# Patient Record
Sex: FEMALE | Race: WHITE | NOT HISPANIC OR LATINO | Employment: FULL TIME | URBAN - METROPOLITAN AREA
[De-identification: names, ages, dates, MRNs, and addresses within clinical notes are randomized per-mention and may not be internally consistent; named-entity substitution may affect disease eponyms.]

---

## 2018-04-24 ENCOUNTER — APPOINTMENT (EMERGENCY)
Dept: ULTRASOUND IMAGING | Facility: HOSPITAL | Age: 38
DRG: 669 | End: 2018-04-24

## 2018-04-24 ENCOUNTER — APPOINTMENT (EMERGENCY)
Dept: CT IMAGING | Facility: HOSPITAL | Age: 38
DRG: 669 | End: 2018-04-24

## 2018-04-24 ENCOUNTER — HOSPITAL ENCOUNTER (INPATIENT)
Facility: HOSPITAL | Age: 38
LOS: 1 days | Discharge: HOME | DRG: 669 | End: 2018-04-26
Attending: EMERGENCY MEDICINE | Admitting: INTERNAL MEDICINE

## 2018-04-24 DIAGNOSIS — N20.0 LEFT NEPHROLITHIASIS: Primary | ICD-10-CM

## 2018-04-24 DIAGNOSIS — N20.0 NEPHROLITHIASIS: ICD-10-CM

## 2018-04-24 LAB
ALBUMIN SERPL BCP-MCNC: 3.4 G/DL (ref 3.5–5)
ALP SERPL-CCNC: 106 U/L (ref 46–116)
ALT SERPL W P-5'-P-CCNC: 26 U/L (ref 12–78)
ANION GAP SERPL CALCULATED.3IONS-SCNC: 8 MMOL/L (ref 4–13)
AST SERPL W P-5'-P-CCNC: 19 U/L (ref 5–45)
BACTERIA UR QL AUTO: ABNORMAL /HPF
BASOPHILS # BLD AUTO: 0.06 THOUSANDS/ΜL (ref 0–0.1)
BASOPHILS NFR BLD AUTO: 1 % (ref 0–1)
BILIRUB SERPL-MCNC: 0.2 MG/DL (ref 0.2–1)
BILIRUB UR QL STRIP: NEGATIVE
BUN SERPL-MCNC: 10 MG/DL (ref 5–25)
CALCIUM SERPL-MCNC: 8.6 MG/DL (ref 8.3–10.1)
CHLORIDE SERPL-SCNC: 103 MMOL/L (ref 100–108)
CLARITY UR: CLEAR
CO2 SERPL-SCNC: 27 MMOL/L (ref 21–32)
COLOR UR: YELLOW
CREAT SERPL-MCNC: 0.84 MG/DL (ref 0.6–1.3)
EOSINOPHIL # BLD AUTO: 0.11 THOUSAND/ΜL (ref 0–0.61)
EOSINOPHIL NFR BLD AUTO: 1 % (ref 0–6)
ERYTHROCYTE [DISTWIDTH] IN BLOOD BY AUTOMATED COUNT: 16.9 % (ref 11.6–15.1)
EXT PREG TEST URINE: NEGATIVE
GFR SERPL CREATININE-BSD FRML MDRD: 90 ML/MIN/1.73SQ M
GLUCOSE SERPL-MCNC: 90 MG/DL (ref 65–140)
GLUCOSE UR STRIP-MCNC: NEGATIVE MG/DL
HCT VFR BLD AUTO: 32.8 % (ref 34.8–46.1)
HGB BLD-MCNC: 10 G/DL (ref 11.5–15.4)
HGB UR QL STRIP.AUTO: ABNORMAL
KETONES UR STRIP-MCNC: ABNORMAL MG/DL
LACTATE SERPL-SCNC: 1.3 MMOL/L (ref 0.5–2)
LEUKOCYTE ESTERASE UR QL STRIP: NEGATIVE
LIPASE SERPL-CCNC: 95 U/L (ref 73–393)
LYMPHOCYTES # BLD AUTO: 2.37 THOUSANDS/ΜL (ref 0.6–4.47)
LYMPHOCYTES NFR BLD AUTO: 23 % (ref 14–44)
MCH RBC QN AUTO: 22.9 PG (ref 26.8–34.3)
MCHC RBC AUTO-ENTMCNC: 30.5 G/DL (ref 31.4–37.4)
MCV RBC AUTO: 75 FL (ref 82–98)
MONOCYTES # BLD AUTO: 0.78 THOUSAND/ΜL (ref 0.17–1.22)
MONOCYTES NFR BLD AUTO: 7 % (ref 4–12)
NEUTROPHILS # BLD AUTO: 7.15 THOUSANDS/ΜL (ref 1.85–7.62)
NEUTS SEG NFR BLD AUTO: 68 % (ref 43–75)
NITRITE UR QL STRIP: NEGATIVE
NON-SQ EPI CELLS URNS QL MICRO: ABNORMAL /HPF
NRBC BLD AUTO-RTO: 0 /100 WBCS
PH UR STRIP.AUTO: 7 [PH] (ref 4.5–8)
PLATELET # BLD AUTO: 496 THOUSANDS/UL (ref 149–390)
PMV BLD AUTO: 11.2 FL (ref 8.9–12.7)
POTASSIUM SERPL-SCNC: 3.5 MMOL/L (ref 3.5–5.3)
PROT SERPL-MCNC: 7.9 G/DL (ref 6.4–8.2)
PROT UR STRIP-MCNC: ABNORMAL MG/DL
RBC # BLD AUTO: 4.36 MILLION/UL (ref 3.81–5.12)
RBC #/AREA URNS AUTO: ABNORMAL /HPF
SODIUM SERPL-SCNC: 138 MMOL/L (ref 136–145)
SP GR UR STRIP.AUTO: <=1.005 (ref 1–1.03)
UROBILINOGEN UR QL STRIP.AUTO: 0.2 E.U./DL
WBC # BLD AUTO: 10.51 THOUSAND/UL (ref 4.31–10.16)
WBC #/AREA URNS AUTO: ABNORMAL /HPF

## 2018-04-24 PROCEDURE — 96375 TX/PRO/DX INJ NEW DRUG ADDON: CPT

## 2018-04-24 PROCEDURE — 83690 ASSAY OF LIPASE: CPT | Performed by: EMERGENCY MEDICINE

## 2018-04-24 PROCEDURE — 96361 HYDRATE IV INFUSION ADD-ON: CPT

## 2018-04-24 PROCEDURE — 76856 US EXAM PELVIC COMPLETE: CPT

## 2018-04-24 PROCEDURE — 96374 THER/PROPH/DIAG INJ IV PUSH: CPT

## 2018-04-24 PROCEDURE — 36415 COLL VENOUS BLD VENIPUNCTURE: CPT | Performed by: EMERGENCY MEDICINE

## 2018-04-24 PROCEDURE — 80053 COMPREHEN METABOLIC PANEL: CPT | Performed by: EMERGENCY MEDICINE

## 2018-04-24 PROCEDURE — 81025 URINE PREGNANCY TEST: CPT | Performed by: EMERGENCY MEDICINE

## 2018-04-24 PROCEDURE — 76830 TRANSVAGINAL US NON-OB: CPT

## 2018-04-24 PROCEDURE — 81001 URINALYSIS AUTO W/SCOPE: CPT | Performed by: EMERGENCY MEDICINE

## 2018-04-24 PROCEDURE — 85025 COMPLETE CBC W/AUTO DIFF WBC: CPT | Performed by: EMERGENCY MEDICINE

## 2018-04-24 PROCEDURE — 96376 TX/PRO/DX INJ SAME DRUG ADON: CPT

## 2018-04-24 PROCEDURE — 74177 CT ABD & PELVIS W/CONTRAST: CPT

## 2018-04-24 PROCEDURE — 83605 ASSAY OF LACTIC ACID: CPT | Performed by: EMERGENCY MEDICINE

## 2018-04-24 RX ORDER — KETOROLAC TROMETHAMINE 30 MG/ML
30 INJECTION, SOLUTION INTRAMUSCULAR; INTRAVENOUS ONCE
Status: COMPLETED | OUTPATIENT
Start: 2018-04-24 | End: 2018-04-24

## 2018-04-24 RX ORDER — ONDANSETRON 2 MG/ML
4 INJECTION INTRAMUSCULAR; INTRAVENOUS ONCE
Status: COMPLETED | OUTPATIENT
Start: 2018-04-24 | End: 2018-04-24

## 2018-04-24 RX ORDER — ONDANSETRON 2 MG/ML
4 INJECTION INTRAMUSCULAR; INTRAVENOUS ONCE
Status: DISCONTINUED | OUTPATIENT
Start: 2018-04-24 | End: 2018-04-26 | Stop reason: HOSPADM

## 2018-04-24 RX ORDER — SODIUM CHLORIDE 9 MG/ML
125 INJECTION, SOLUTION INTRAVENOUS CONTINUOUS
Status: DISCONTINUED | OUTPATIENT
Start: 2018-04-24 | End: 2018-04-25 | Stop reason: SDUPTHER

## 2018-04-24 RX ADMIN — ONDANSETRON 4 MG: 2 INJECTION INTRAMUSCULAR; INTRAVENOUS at 20:10

## 2018-04-24 RX ADMIN — ONDANSETRON 4 MG: 2 INJECTION INTRAMUSCULAR; INTRAVENOUS at 23:04

## 2018-04-24 RX ADMIN — SODIUM CHLORIDE 1000 ML: 0.9 INJECTION, SOLUTION INTRAVENOUS at 23:05

## 2018-04-24 RX ADMIN — SODIUM CHLORIDE 125 ML/HR: 0.9 INJECTION, SOLUTION INTRAVENOUS at 21:13

## 2018-04-24 RX ADMIN — KETOROLAC TROMETHAMINE 30 MG: 30 INJECTION, SOLUTION INTRAMUSCULAR at 23:04

## 2018-04-24 RX ADMIN — KETOROLAC TROMETHAMINE 30 MG: 30 INJECTION, SOLUTION INTRAMUSCULAR at 21:15

## 2018-04-24 RX ADMIN — HYDROMORPHONE HYDROCHLORIDE 1 MG: 1 INJECTION, SOLUTION INTRAMUSCULAR; INTRAVENOUS; SUBCUTANEOUS at 21:13

## 2018-04-24 RX ADMIN — IOHEXOL 100 ML: 350 INJECTION, SOLUTION INTRAVENOUS at 22:16

## 2018-04-25 ENCOUNTER — ANESTHESIA EVENT (INPATIENT)
Dept: PERIOP | Facility: HOSPITAL | Age: 38
DRG: 669 | End: 2018-04-25

## 2018-04-25 ENCOUNTER — ANESTHESIA (INPATIENT)
Dept: PERIOP | Facility: HOSPITAL | Age: 38
DRG: 669 | End: 2018-04-25

## 2018-04-25 ENCOUNTER — APPOINTMENT (INPATIENT)
Dept: RADIOLOGY | Facility: HOSPITAL | Age: 38
DRG: 669 | End: 2018-04-25

## 2018-04-25 PROBLEM — N20.0 LEFT NEPHROLITHIASIS: Status: ACTIVE | Noted: 2018-04-25

## 2018-04-25 LAB
ABO GROUP BLD: NORMAL
ANION GAP SERPL CALCULATED.3IONS-SCNC: 6 MMOL/L (ref 4–13)
BLD GP AB SCN SERPL QL: NEGATIVE
BUN SERPL-MCNC: 8 MG/DL (ref 5–25)
CALCIUM SERPL-MCNC: 7.8 MG/DL (ref 8.3–10.1)
CHLORIDE SERPL-SCNC: 108 MMOL/L (ref 100–108)
CO2 SERPL-SCNC: 23 MMOL/L (ref 21–32)
CREAT SERPL-MCNC: 0.64 MG/DL (ref 0.6–1.3)
ERYTHROCYTE [DISTWIDTH] IN BLOOD BY AUTOMATED COUNT: 17 % (ref 11.6–15.1)
GFR SERPL CREATININE-BSD FRML MDRD: 115 ML/MIN/1.73SQ M
GLUCOSE SERPL-MCNC: 111 MG/DL (ref 65–140)
HCT VFR BLD AUTO: 26.3 % (ref 34.8–46.1)
HGB BLD-MCNC: 8.1 G/DL (ref 11.5–15.4)
MCH RBC QN AUTO: 23.1 PG (ref 26.8–34.3)
MCHC RBC AUTO-ENTMCNC: 30.8 G/DL (ref 31.4–37.4)
MCV RBC AUTO: 75 FL (ref 82–98)
PLATELET # BLD AUTO: 381 THOUSANDS/UL (ref 149–390)
PMV BLD AUTO: 10.5 FL (ref 8.9–12.7)
POTASSIUM SERPL-SCNC: 3.9 MMOL/L (ref 3.5–5.3)
RBC # BLD AUTO: 3.51 MILLION/UL (ref 3.81–5.12)
RH BLD: NEGATIVE
SODIUM SERPL-SCNC: 137 MMOL/L (ref 136–145)
SPECIMEN EXPIRATION DATE: NORMAL
WBC # BLD AUTO: 10.1 THOUSAND/UL (ref 4.31–10.16)

## 2018-04-25 PROCEDURE — 86850 RBC ANTIBODY SCREEN: CPT | Performed by: ANESTHESIOLOGY

## 2018-04-25 PROCEDURE — 85027 COMPLETE CBC AUTOMATED: CPT | Performed by: PHYSICIAN ASSISTANT

## 2018-04-25 PROCEDURE — C1769 GUIDE WIRE: HCPCS | Performed by: UROLOGY

## 2018-04-25 PROCEDURE — 74420 UROGRAPHY RTRGR +-KUB: CPT

## 2018-04-25 PROCEDURE — 94760 N-INVAS EAR/PLS OXIMETRY 1: CPT

## 2018-04-25 PROCEDURE — 52356 CYSTO/URETERO W/LITHOTRIPSY: CPT | Performed by: UROLOGY

## 2018-04-25 PROCEDURE — 86900 BLOOD TYPING SEROLOGIC ABO: CPT | Performed by: ANESTHESIOLOGY

## 2018-04-25 PROCEDURE — BT1FYZZ FLUOROSCOPY OF LEFT KIDNEY, URETER AND BLADDER USING OTHER CONTRAST: ICD-10-PCS | Performed by: UROLOGY

## 2018-04-25 PROCEDURE — 99285 EMERGENCY DEPT VISIT HI MDM: CPT

## 2018-04-25 PROCEDURE — C1758 CATHETER, URETERAL: HCPCS | Performed by: UROLOGY

## 2018-04-25 PROCEDURE — C2617 STENT, NON-COR, TEM W/O DEL: HCPCS | Performed by: UROLOGY

## 2018-04-25 PROCEDURE — 99222 1ST HOSP IP/OBS MODERATE 55: CPT | Performed by: PHYSICIAN ASSISTANT

## 2018-04-25 PROCEDURE — 0TC78ZZ EXTIRPATION OF MATTER FROM LEFT URETER, VIA NATURAL OR ARTIFICIAL OPENING ENDOSCOPIC: ICD-10-PCS | Performed by: UROLOGY

## 2018-04-25 PROCEDURE — 86901 BLOOD TYPING SEROLOGIC RH(D): CPT | Performed by: ANESTHESIOLOGY

## 2018-04-25 PROCEDURE — 0T778DZ DILATION OF LEFT URETER WITH INTRALUMINAL DEVICE, VIA NATURAL OR ARTIFICIAL OPENING ENDOSCOPIC: ICD-10-PCS | Performed by: UROLOGY

## 2018-04-25 PROCEDURE — 87086 URINE CULTURE/COLONY COUNT: CPT | Performed by: UROLOGY

## 2018-04-25 PROCEDURE — 80048 BASIC METABOLIC PNL TOTAL CA: CPT | Performed by: PHYSICIAN ASSISTANT

## 2018-04-25 PROCEDURE — 99223 1ST HOSP IP/OBS HIGH 75: CPT | Performed by: INTERNAL MEDICINE

## 2018-04-25 DEVICE — INLAY OPTIMA URETERAL STENT W/O GUIDEWIRE
Type: IMPLANTABLE DEVICE | Status: FUNCTIONAL
Brand: BARD® INLAY OPTIMA® URETERAL STENT

## 2018-04-25 RX ORDER — ONDANSETRON 2 MG/ML
INJECTION INTRAMUSCULAR; INTRAVENOUS AS NEEDED
Status: DISCONTINUED | OUTPATIENT
Start: 2018-04-25 | End: 2018-04-25 | Stop reason: SURG

## 2018-04-25 RX ORDER — ACETAMINOPHEN 325 MG/1
650 TABLET ORAL EVERY 6 HOURS PRN
Status: DISCONTINUED | OUTPATIENT
Start: 2018-04-25 | End: 2018-04-26 | Stop reason: HOSPADM

## 2018-04-25 RX ORDER — ONDANSETRON 2 MG/ML
4 INJECTION INTRAMUSCULAR; INTRAVENOUS ONCE AS NEEDED
Status: DISCONTINUED | OUTPATIENT
Start: 2018-04-25 | End: 2018-04-25 | Stop reason: HOSPADM

## 2018-04-25 RX ORDER — ONDANSETRON 2 MG/ML
4 INJECTION INTRAMUSCULAR; INTRAVENOUS EVERY 6 HOURS PRN
Status: DISCONTINUED | OUTPATIENT
Start: 2018-04-25 | End: 2018-04-26 | Stop reason: HOSPADM

## 2018-04-25 RX ORDER — MORPHINE SULFATE 2 MG/ML
2 INJECTION, SOLUTION INTRAMUSCULAR; INTRAVENOUS ONCE
Status: COMPLETED | OUTPATIENT
Start: 2018-04-25 | End: 2018-04-25

## 2018-04-25 RX ORDER — MAGNESIUM HYDROXIDE 1200 MG/15ML
LIQUID ORAL AS NEEDED
Status: DISCONTINUED | OUTPATIENT
Start: 2018-04-25 | End: 2018-04-25 | Stop reason: HOSPADM

## 2018-04-25 RX ORDER — SODIUM CHLORIDE 9 MG/ML
125 INJECTION, SOLUTION INTRAVENOUS CONTINUOUS
Status: DISCONTINUED | OUTPATIENT
Start: 2018-04-25 | End: 2018-04-26 | Stop reason: HOSPADM

## 2018-04-25 RX ORDER — HYDROCODONE BITARTRATE AND ACETAMINOPHEN 5; 325 MG/1; MG/1
2 TABLET ORAL EVERY 4 HOURS PRN
Status: DISCONTINUED | OUTPATIENT
Start: 2018-04-25 | End: 2018-04-26 | Stop reason: HOSPADM

## 2018-04-25 RX ORDER — MORPHINE SULFATE 2 MG/ML
2 INJECTION, SOLUTION INTRAMUSCULAR; INTRAVENOUS
Status: DISCONTINUED | OUTPATIENT
Start: 2018-04-25 | End: 2018-04-26

## 2018-04-25 RX ORDER — TAMSULOSIN HYDROCHLORIDE 0.4 MG/1
0.4 CAPSULE ORAL
Status: DISCONTINUED | OUTPATIENT
Start: 2018-04-25 | End: 2018-04-26 | Stop reason: HOSPADM

## 2018-04-25 RX ORDER — MIDAZOLAM HYDROCHLORIDE 1 MG/ML
INJECTION INTRAMUSCULAR; INTRAVENOUS AS NEEDED
Status: DISCONTINUED | OUTPATIENT
Start: 2018-04-25 | End: 2018-04-25 | Stop reason: SURG

## 2018-04-25 RX ORDER — KETOROLAC TROMETHAMINE 30 MG/ML
INJECTION, SOLUTION INTRAMUSCULAR; INTRAVENOUS AS NEEDED
Status: DISCONTINUED | OUTPATIENT
Start: 2018-04-25 | End: 2018-04-25 | Stop reason: SURG

## 2018-04-25 RX ORDER — DIPHENHYDRAMINE HYDROCHLORIDE 50 MG/ML
12.5 INJECTION INTRAMUSCULAR; INTRAVENOUS ONCE AS NEEDED
Status: DISCONTINUED | OUTPATIENT
Start: 2018-04-25 | End: 2018-04-25 | Stop reason: HOSPADM

## 2018-04-25 RX ORDER — PROPOFOL 10 MG/ML
INJECTION, EMULSION INTRAVENOUS AS NEEDED
Status: DISCONTINUED | OUTPATIENT
Start: 2018-04-25 | End: 2018-04-25 | Stop reason: SURG

## 2018-04-25 RX ORDER — FENTANYL CITRATE 50 UG/ML
INJECTION, SOLUTION INTRAMUSCULAR; INTRAVENOUS AS NEEDED
Status: DISCONTINUED | OUTPATIENT
Start: 2018-04-25 | End: 2018-04-25 | Stop reason: SURG

## 2018-04-25 RX ORDER — IBUPROFEN 400 MG/1
400 TABLET ORAL ONCE
COMMUNITY
End: 2018-04-26 | Stop reason: HOSPADM

## 2018-04-25 RX ADMIN — FENTANYL CITRATE 25 MCG: 50 INJECTION, SOLUTION INTRAMUSCULAR; INTRAVENOUS at 19:09

## 2018-04-25 RX ADMIN — KETOROLAC TROMETHAMINE 30 MG: 30 INJECTION, SOLUTION INTRAMUSCULAR at 18:43

## 2018-04-25 RX ADMIN — TAMSULOSIN HYDROCHLORIDE 0.4 MG: 0.4 CAPSULE ORAL at 22:11

## 2018-04-25 RX ADMIN — ONDANSETRON 4 MG: 2 INJECTION INTRAMUSCULAR; INTRAVENOUS at 18:24

## 2018-04-25 RX ADMIN — PROPOFOL 200 MG: 10 INJECTION, EMULSION INTRAVENOUS at 18:24

## 2018-04-25 RX ADMIN — MORPHINE SULFATE 2 MG: 2 INJECTION, SOLUTION INTRAMUSCULAR; INTRAVENOUS at 11:09

## 2018-04-25 RX ADMIN — HYDROCODONE BITARTRATE AND ACETAMINOPHEN 2 TABLET: 5; 325 TABLET ORAL at 21:46

## 2018-04-25 RX ADMIN — MORPHINE SULFATE 2 MG: 2 INJECTION, SOLUTION INTRAMUSCULAR; INTRAVENOUS at 17:47

## 2018-04-25 RX ADMIN — MORPHINE SULFATE 2 MG: 2 INJECTION, SOLUTION INTRAMUSCULAR; INTRAVENOUS at 15:36

## 2018-04-25 RX ADMIN — FENTANYL CITRATE 25 MCG: 50 INJECTION, SOLUTION INTRAMUSCULAR; INTRAVENOUS at 18:30

## 2018-04-25 RX ADMIN — FENTANYL CITRATE 25 MCG: 50 INJECTION, SOLUTION INTRAMUSCULAR; INTRAVENOUS at 18:40

## 2018-04-25 RX ADMIN — MORPHINE SULFATE 2 MG: 2 INJECTION, SOLUTION INTRAMUSCULAR; INTRAVENOUS at 07:25

## 2018-04-25 RX ADMIN — SODIUM CHLORIDE 125 ML/HR: 0.9 INJECTION, SOLUTION INTRAVENOUS at 02:09

## 2018-04-25 RX ADMIN — FENTANYL CITRATE 25 MCG: 50 INJECTION, SOLUTION INTRAMUSCULAR; INTRAVENOUS at 18:43

## 2018-04-25 RX ADMIN — SODIUM CHLORIDE 125 ML/HR: 0.9 INJECTION, SOLUTION INTRAVENOUS at 10:32

## 2018-04-25 RX ADMIN — TAMSULOSIN HYDROCHLORIDE 0.4 MG: 0.4 CAPSULE ORAL at 02:18

## 2018-04-25 RX ADMIN — DEXAMETHASONE SODIUM PHOSPHATE 4 MG: 10 INJECTION INTRAMUSCULAR; INTRAVENOUS at 18:24

## 2018-04-25 RX ADMIN — CEFAZOLIN SODIUM 2000 MG: 2 SOLUTION INTRAVENOUS at 18:24

## 2018-04-25 RX ADMIN — SODIUM CHLORIDE: 0.9 INJECTION, SOLUTION INTRAVENOUS at 18:20

## 2018-04-25 RX ADMIN — LIDOCAINE HYDROCHLORIDE 100 MG: 20 INJECTION, SOLUTION INTRAVENOUS at 18:24

## 2018-04-25 RX ADMIN — MIDAZOLAM HYDROCHLORIDE 2 MG: 1 INJECTION, SOLUTION INTRAMUSCULAR; INTRAVENOUS at 18:20

## 2018-04-25 RX ADMIN — SODIUM CHLORIDE 125 ML/HR: 0.9 INJECTION, SOLUTION INTRAVENOUS at 21:37

## 2018-04-25 NOTE — CASE MANAGEMENT
Initial Clinical Review    Admission: Date/Time/Statement: 4/25/18 @ 0001     Orders Placed This Encounter   Procedures    Inpatient Admission     Standing Status:   Standing     Number of Occurrences:   1     Order Specific Question:   Admitting Physician     Answer:   Urszula Miranda [[id number]]     Order Specific Question:   Level of Care     Answer:   Med Surg [[id number]]     Order Specific Question:   Estimated length of stay     Answer:   More than 2 Midnights     Order Specific Question:   Certification     Answer:   I certify that inpatient services are medically necessary for this patient for a duration of greater than two midnights  See H&P and MD Progress Notes for additional information about the patient's course of treatment  ED: Date/Time/Mode of Arrival:   ED Arrival Information     Expected Arrival Acuity Means of Arrival Escorted By Service Admission Type    - 4/24/2018 19:24 Urgent Walk-In Family Member General Medicine Urgent    Arrival Complaint    abdominal pain          Chief Complaint:   Chief Complaint   Patient presents with    Abdominal Pain     Patient c/o left lower abdominal pain that started for approximately 1 month  History of Illness: Natty Hutchison is a 40 y o  female who presents with complaints of left-sided abdominal pain that radiates to her back  Patient states that for last 3 weeks she has been having some subtle pain but that around 4:00 p m  today she did sudden increase intensity of this pain  Patient states she feels nauseated with vomiting  Patient had a fever, chills, chest pain, shortness of breath  Patient states she has had kidney stones in the past none of which required any type of procedure  Patient states her last 1 was approximately 4 years ago       ED Vital Signs:   ED Triage Vitals   Temperature Pulse Respirations Blood Pressure SpO2   04/24/18 1946 04/24/18 1945 04/24/18 1945 04/24/18 1946 04/24/18 1945   98 1 °F (36 7 °C) 85 18 161/90 100 % Temp Source Heart Rate Source Patient Position - Orthostatic VS BP Location FiO2 (%)   04/24/18 1945 04/24/18 1945 04/24/18 1945 04/24/18 1945 --   Oral Monitor Sitting Right arm       Pain Score       04/24/18 2113       Worst Possible Pain        Wt Readings from Last 1 Encounters:   04/25/18 90 2 kg (198 lb 13 7 oz)       Vital Signs (abnormal):   04/24/18 2245 -- 89 20 138/66 94 % -- DB   04/24/18 2118 -- 69 16  176/91 100 % -- DB   04/24/18 1946 98 1 °F (36 7 °C) -- -- 161/90        Abnormal Labs/Diagnostic Test Results: alb   3 4, wbc 10 51, H&H   10 0 32 8, plt  496  US pelvis- wnl   CT abd   CT abd -    1   Proximal left ureter 5 mm calculus resulting in mild to moderate left hydroureteronephrosis and delayed left nephrogram   Additional nonobstructive bilateral nephrolithiasis as above  2   Enlarged fatty liver          ED Treatment:   Medication Administration from 04/24/2018 1924 to 04/25/2018 0125       Date/Time Order Dose Route Action Action by Comments     04/24/2018 2010 ondansetron (ZOFRAN) injection 4 mg 4 mg Intravenous Given Godwin Zuluaga RN      04/24/2018 2115 ketorolac (TORADOL) injection 30 mg 30 mg Intravenous Given Godwin Zuluaga RN      04/24/2018 2113 HYDROmorphone (DILAUDID) injection 1 mg 1 mg Intravenous Given Godwin Zuluaga RN      04/24/2018 2304 sodium chloride 0 9 % infusion 0 mL/hr Intravenous Stopped Godwin Zuluaga RN      04/24/2018 2113 sodium chloride 0 9 % infusion 125 mL/hr Intravenous Batsheva 37 Godwin Zuluaga [facility]      04/24/2018 2216 iohexol (OMNIPAQUE) 350 MG/ML injection (MULTI-DOSE) 100 mL 100 mL Intravenous Given [facility]      04/24/2018 2301 HYDROmorphone (DILAUDID) injection 1 mg 1 mg Intravenous Not Given Godwin Zuluaga RN Patient feeling dizzy and nauseated  Notified physician       04/24/2018 2301 ondansetron (ZOFRAN) injection 4 mg 4 mg Intravenous Not Given Godwin Zuluaga RN      04/24/2018 9572 ondansetron (ZOFRAN) injection 4 mg 4 mg Intravenous Given Jan Martínez RN      04/24/2018 2304 ketorolac (TORADOL) injection 30 mg 30 mg Intravenous Given Jan Martínez RN      04/25/2018 0059 sodium chloride 0 9 % bolus 1,000 mL 0 mL Intravenous Stopped Jan Martínez RN      04/24/2018 2305 sodium chloride 0 9 % bolus 1,000 mL 1,000 mL Intravenous New Bag Jan Martínez RN           Past Medical/Surgical History: Active Ambulatory Problems     Diagnosis Date Noted    No Active Ambulatory Problems     Resolved Ambulatory Problems     Diagnosis Date Noted    No Resolved Ambulatory Problems     Past Medical History:   Diagnosis Date    Renal disorder        Admitting Diagnosis: Abdominal pain [R10 9]  Left nephrolithiasis [N20 0]    Age/Sex: 40 y o  female    Assessment/Plan:        * Left nephrolithiasis   Assessment & Plan     Admit  Pain control  IV fluids  Urology consult pending             VTE Prophylaxis: Pharmacologic VTE Prophylaxis contraindicated due to Low risk  / reason for no mechanical VTE prophylaxis Low risk   Code Status:  Full  POLST: There is no POLST form on file for this patient (pre-hospital)  Discussion with family:  Family is present for the entire evaluation    Anticipated Length of Stay:  Patient will be admitted on an Inpatient basis with an anticipated length of stay of  more than 2 midnights     Justification for Hospital Stay:  Pain control, Urology evaluation    Admission Orders:  Scheduled Meds:   Current Facility-Administered Medications:  acetaminophen 650 mg Oral Q6H PRN Khris Desouza PA-C    HYDROmorphone 1 mg Intravenous Once Cheral Lefort, MD    morphine injection 2 mg Intravenous Q3H PRN Khris Desouza PA-C    ondansetron 4 mg Intravenous Once Cheral Lefort, MD    ondansetron 4 mg Intravenous Q6H PRN Khris Desouza PA-C    sodium chloride 125 mL/hr Intravenous Continuous Khris Desouza PA-C Last Rate: 125 mL/hr (04/25/18 1032)   tamsulosin 0 4 mg Oral Daily With [facility] Karlene Piña PA-C      Continuous Infusions:   sodium chloride 125 mL/hr Last Rate: 125 mL/hr (04/25/18 1032)     PRN Meds:   acetaminophen    morphine injection  x2    Ondansetron    4/25  cbc  ,bmp   Up w/ assist   4/25 cystoscopy   Urology consult     Urology note 4/25  Proximal left ureter 5 mm calculus resulting in mild to moderate left   PLAN:    - reviewed spontaneous passage versus surgical intervention in the form of ureteroscopy  We discussed the risks and benefits of each of these options  - plan will be continue IVF, tamsulosin, and straining fluids  Continue pain control  - If patient's pain does not improve throughout this morning/afternoon, she will proceed to the OR for cystoscopy, left ureteroscopy, holmium laser, basket extraction, retrograde pyelogram, and left ureteral stent insertion  - remain NPO

## 2018-04-25 NOTE — OP NOTE
OPERATIVE REPORT  PATIENT NAME: Marycruz Calvo    :  1980  MRN: [id number]  Pt Location: [facility] OR ROOM 04    SURGERY DATE: 2018    Surgeon(s) and Role:     * Merissa Mina MD - Primary    Preop Diagnosis:  Left nephrolithiasis [N20 0]    Post-Op Diagnosis Codes:     * Left nephrolithiasis [N20 0]    Procedure(s) (LRB):  CYSTOSCOPY URETEROSCOPY WITH LITHOTRIPSY HOLMIUM LASER, RETROGRADE PYELOGRAM AND INSERTION STENT URETERAL (Left)    Specimen(s):  ID Type Source Tests Collected by Time Destination   A : urine culture Urine Urine, Cystoscopic URINE CULTURE Merissa Mina MD 2018 1854        Estimated Blood Loss:   Minimal    Drains:  Left 22-6 Qatari double-J ureteral stent, string left in place       Anesthesia Type:   General    Operative Indications:  Left nephrolithiasis [N20 0]      Operative Findings:  5 mm left proximal ureteral calculus decimated with holmium laser lithotripsy after the stone was pushed back into the kidney  Complications:   None      Procedure Description: Marycruz Calvo is a 40y o -year-old female  with a history of acute onset left-sided flank pain  A CT scan revealed a 5 mm left proximal ureteral calculus  The patient required admission secondary to pain control  There were no signs of fever, leukocytosis, or infection  Risk and benefits of ureteroscopy were discussed and reviewed  Informed consent was obtained  The patient was brought to the operating room on 2018  After the smooth induction of general LMA anesthesia, the patient was placed in the dorsal lithotomy position  Her genitalia was prepped and draped in a sterile fashion  Intravenous antibiotics were administered  A timeout was performed with all members of the operative team confirmed the patient's identity, procedure to be performed, and laterality of the case  A 22 Qatari rigid cystoscope with 30° lens was inserted  The bladder was thoroughly inspected   It was no evidence of mucosal abnormalities or lesions  There were no calculi identified  Attention was focused on the left ureteral orifice  A 5 [place] open-ended catheter was inserted and a left retrograde pyelogram was performed  A filling defect in the left proximal ureter was identified consistent with the stone  A wire was passed proximal to the stone and secured as a safety  A 12 [place] Ruiz catheter was inserted for continuous bladder drainage  A long semirigid ureteroscope was then inserted adjacent to the wire  The stone was identified but could not be engaged with the laser secondary to the torque on the scope  I therefore placed a 2nd wire followed by ureteral access sheath followed by an Olympus 5 3 [place] flexible ureteral scope  The stone was identified at the junction of the kidney and ureter and was pushed back into the kidney with the flexible ureteral scope  The stone was then decimated with holmium laser lithotripsy until the fragments were small enough to be passable  The flexible ureteral scope and sheath were then removed together and the ureter was inspected  There were no residual stones within the ureter     The wire was backloaded through the cystoscope  The cystoscope was repassed into the bladder  A 22-6 [place] left double-J ureteral stent was then placed without difficulty  The proximal coil was appreciated in the left renal pelvis and the distal coil was visualized within the bladder  The bladder was emptied and the cystoscope was removed  A string was left in place and secured to the inner thigh with Tegaderm  Overall the patient tolerated the procedure well and were no complications  The patient was extubated in the operating room and transferred to the PACU in stable condition at the conclusion of the case        Patient Disposition:  PACU stable and extubated    SIGNATURE: Kayla Henley MD  DATE: April 25, 2018  TIME: 7:14 PM

## 2018-04-25 NOTE — ED PROVIDER NOTES
History  Chief Complaint   Patient presents with    Abdominal Pain     Patient c/o left lower abdominal pain that started for approximately 1 month  Patient is a 44-year-old female  She has had left lower quadrant abdominal pain for about 3 weeks  She has had C-sections  She has had a tubal ligation  She has had a cholecystectomy  She has had kidney stones  Yesterday patient had intercourse  Afterwards the pain became worse  She became nauseated when the pain became worse  She has not vomited  No hematemesis  It does not radiate to the back like prior kidney stones  No diarrhea or constipation  No melena or hematochezia  No vaginal discharge or bleeding  No dysuria, hematuria or frequency  No other urinary complaints  Pain is currently severe  No relieving factors  None       Past Medical History:   Diagnosis Date    Renal disorder        Past Surgical History:   Procedure Laterality Date     SECTION      CHOLECYSTECTOMY      TUBAL LIGATION         History reviewed  No pertinent family history  I have reviewed and agree with the history as documented  Social History   Substance Use Topics    Smoking status: Never Smoker    Smokeless tobacco: Never Used    Alcohol use No        Review of Systems   Constitutional: Negative for chills and fever  HENT: Negative for rhinorrhea and sore throat  Eyes: Negative for pain, redness and visual disturbance  Respiratory: Negative for cough and shortness of breath  Cardiovascular: Negative for chest pain and leg swelling  Gastrointestinal: Positive for abdominal pain and nausea  Negative for diarrhea and vomiting  Endocrine: Negative for polydipsia and polyuria  Genitourinary: Negative for dysuria, frequency, hematuria, vaginal bleeding and vaginal discharge  Musculoskeletal: Negative for back pain and neck pain  Skin: Negative for rash and wound  Allergic/Immunologic: Negative for immunocompromised state  Neurological: Negative for weakness, numbness and headaches  Hematological: Does not bruise/bleed easily  Psychiatric/Behavioral: Negative for hallucinations and suicidal ideas  All other systems reviewed and are negative  Physical Exam  ED Triage Vitals   Temperature Pulse Respirations Blood Pressure SpO2   04/24/18 1946 04/24/18 1945 04/24/18 1945 04/24/18 1946 04/24/18 1945   98 1 °F (36 7 °C) 85 18 161/90 100 %      Temp Source Heart Rate Source Patient Position - Orthostatic VS BP Location FiO2 (%)   04/24/18 1945 04/24/18 1945 04/24/18 1945 04/24/18 1945 --   Oral Monitor Sitting Right arm       Pain Score       --                  Orthostatic Vital Signs  Vitals:    04/24/18 1945 04/24/18 1946   BP:  161/90   Pulse: 85    Patient Position - Orthostatic VS: Sitting        Physical Exam   Constitutional: She is oriented to person, place, and time  She appears well-developed and well-nourished  She appears distressed  HENT:   Head: Normocephalic and atraumatic  Mouth/Throat: Oropharynx is clear and moist    Eyes: Conjunctivae are normal  Right eye exhibits no discharge  Left eye exhibits no discharge  No scleral icterus  Neck: Normal range of motion  Neck supple  Cardiovascular: Normal rate, regular rhythm, normal heart sounds and intact distal pulses  Exam reveals no gallop and no friction rub  No murmur heard  Pulmonary/Chest: Effort normal and breath sounds normal  No stridor  No respiratory distress  She has no wheezes  She has no rales  Abdominal: Soft  Bowel sounds are normal  She exhibits no distension  There is tenderness  There is no rebound and no guarding  There is severe tenderness to the left lower quadrant  Musculoskeletal: Normal range of motion  She exhibits no edema, tenderness or deformity  No CVA tenderness  No calf tenderness  Neurological: She is alert and oriented to person, place, and time  She has normal strength  No sensory deficit   GCS eye subscore is 4  GCS verbal subscore is 5  GCS motor subscore is 6  Skin: Skin is warm and dry  No rash noted  She is not diaphoretic  Psychiatric: She has a normal mood and affect  Her behavior is normal    Vitals reviewed  ED Medications  Medications   ketorolac (TORADOL) injection 30 mg (not administered)   HYDROmorphone (DILAUDID) injection 1 mg (not administered)   sodium chloride 0 9 % infusion (not administered)   ondansetron (ZOFRAN) injection 4 mg (4 mg Intravenous Given 4/24/18 2010)       Diagnostic Studies  Results Reviewed     Procedure Component Value Units Date/Time    CBC and differential [[id number]]     Lab Status:  No result Specimen:  Blood     UA w Reflex to Microscopic [[id number]]     Lab Status:  No result Specimen:  Urine     POCT pregnancy, urine [[id number]]     Lab Status:  No result     Comprehensive metabolic panel [[id number]]     Lab Status:  No result Specimen:  Blood     Lipase [[id number]]     Lab Status:  No result Specimen:  Blood     Lactic acid, plasma [[id number]]     Lab Status:  No result Specimen:  Blood                  US pelvis complete    (Results Pending)   CT abdomen pelvis with contrast    (Results Pending)              Procedures  Procedures       Phone Contacts  ED Phone Contact    ED Course  ED Course                                [facility]  CritCare Time    Disposition  Final diagnoses:   None     ED Disposition     None      Follow-up Information    None       Patient's Medications    No medications on file     No discharge procedures on file      ED Provider  Electronically Signed by           Dorian Runner, MD  04/26/18 1580

## 2018-04-25 NOTE — H&P
H&P- Jimi العلي 1980, 40 y o  female MRN: [id number]    Unit/Bed#: ED 23 Encounter: [id number]    Primary Care Provider: No primary care provider on file  Date and time admitted to hospital: 4/24/2018  7:39 PM        * Left nephrolithiasis   Assessment & Plan    Admit  Pain control  IV fluids  Urology consult pending                VTE Prophylaxis: Pharmacologic VTE Prophylaxis contraindicated due to Low risk  / reason for no mechanical VTE prophylaxis Low risk   Code Status:  Full  POLST: There is no POLST form on file for this patient (pre-hospital)  Discussion with family:  Family is present for the entire evaluation  Anticipated Length of Stay:  Patient will be admitted on an Inpatient basis with an anticipated length of stay of  more than 2 midnights  Justification for Hospital Stay:  Pain control, Urology evaluation    Total Time for Visit, including Counseling / Coordination of Care: 30 minutes  Greater than 50% of this total time spent on direct patient counseling and coordination of care  Chief Complaint:   Abdominal pain    History of Present Illness:    Jimi العلي is a 40 y o  female who presents with complaints of left-sided abdominal pain that radiates to her back  Patient states that for last 3 weeks she has been having some subtle pain but that around 4:00 p m  today she did sudden increase intensity of this pain  Patient states she feels nauseated with vomiting  Patient had a fever, chills, chest pain, shortness of breath  Patient states she has had kidney stones in the past none of which required any type of procedure  Patient states her last 1 was approximately 4 years ago       Review of Systems:    Review of Systems   Gastrointestinal: Positive for abdominal pain  All other systems reviewed and are negative        Past Medical and Surgical History:     Past Medical History:   Diagnosis Date    Renal disorder        Past Surgical History:   Procedure Laterality Date     SECTION      CHOLECYSTECTOMY      TUBAL LIGATION         Meds/Allergies:    Prior to Admission medications    Not on File     I have reviewed home medications with patient personally  Allergies: No Known Allergies    Social History:     Marital Status: /Civil Union   Occupation:    Patient Pre-hospital Living Situation:  Lives at home  Patient Pre-hospital Level of Mobility:  Ambulatory  Patient Pre-hospital Diet Restrictions:  None  Substance Use History:   History   Alcohol Use No     History   Smoking Status    Never Smoker   Smokeless Tobacco    Never Used     History   Drug Use No       Family History:    History reviewed  No pertinent family history  Physical Exam:     Vitals:   Blood Pressure: 116/73 (18)  Pulse: 75 (18)  Temperature: 98 1 °F (36 7 °C) (18)  Temp Source: Oral (18)  Respirations: 20 (18)  Height: 5' 4" (162 6 cm) (18)  Weight - Scale: 92 5 kg (204 lb) (18)  SpO2: 96 % (18)    Physical Exam   Constitutional: She is oriented to person, place, and time  She appears well-developed and well-nourished  HENT:   Head: Normocephalic and atraumatic  Right Ear: External ear normal    Left Ear: External ear normal    Nose: Nose normal    Mouth/Throat: Oropharynx is clear and moist    Eyes: Conjunctivae and EOM are normal  Pupils are equal, round, and reactive to light  Neck: Normal range of motion  Cardiovascular: Normal rate, regular rhythm and normal heart sounds  Pulmonary/Chest: Effort normal and breath sounds normal    Abdominal: Soft  There is tenderness in the left upper quadrant and left lower quadrant  Musculoskeletal: Normal range of motion  Neurological: She is alert and oriented to person, place, and time  Skin: Skin is warm and dry  Psychiatric: She has a normal mood and affect   Her behavior is normal  Judgment and thought content normal    Vitals reviewed  Additional Data:     Lab Results: I have personally reviewed pertinent reports  Results from last 7 days  Lab Units 04/24/18  2115   WBC Thousand/uL 10 51*   HEMOGLOBIN g/dL 10 0*   HEMATOCRIT % 32 8*   PLATELETS Thousands/uL 496*   NEUTROS PCT % 68   LYMPHS PCT % 23   MONOS PCT % 7   EOS PCT % 1       Results from last 7 days  Lab Units 04/24/18  2115   SODIUM mmol/L 138   POTASSIUM mmol/L 3 5   CHLORIDE mmol/L 103   CO2 mmol/L 27   BUN mg/dL 10   CREATININE mg/dL 0 84   CALCIUM mg/dL 8 6   TOTAL PROTEIN g/dL 7 9   BILIRUBIN TOTAL mg/dL 0 20   ALK PHOS U/L 106   ALT U/L 26   AST U/L 19   GLUCOSE RANDOM mg/dL 90           Imaging: I have personally reviewed pertinent reports  US pelvis complete w transvaginal   Final Result by Madyson Milton DO (04/24 2233)       Limited study as described above  No acute findings  Workstation performed: [id number]         CT abdomen pelvis with contrast   Final Result by Zoë Astorga MD (04/24 2230)      1  Proximal left ureter 5 mm calculus resulting in mild to moderate left hydroureteronephrosis and delayed left nephrogram   Additional nonobstructive bilateral nephrolithiasis as above  2   Enlarged fatty liver  Workstation performed: [id number]             EKG, Pathology, and Other Studies Reviewed on Admission:   · EKG:     Allscripts / Epic Records Reviewed: Yes     ** Please Note: This note has been constructed using a voice recognition system   **

## 2018-04-25 NOTE — PLAN OF CARE
Problem: PAIN - ADULT  Goal: Verbalizes/displays adequate comfort level or baseline comfort level  Interventions:  - Encourage patient to monitor pain and request assistance  - Assess pain using appropriate pain scale  - Administer analgesics based on type and severity of pain and evaluate response  - Implement non-pharmacological measures as appropriate and evaluate response  - Consider cultural and social influences on pain and pain management  - Notify physician/advanced practitioner if interventions unsuccessful or patient reports new pain   Outcome: Progressing      Problem: INFECTION - ADULT  Goal: Absence or prevention of progression during hospitalization  INTERVENTIONS:  - Assess and monitor for signs and symptoms of infection  - Monitor lab/diagnostic results  - Monitor all insertion sites, i e  indwelling lines, tubes, and drains  - Monitor endotracheal (as able) and nasal secretions for changes in amount and color  - [place] appropriate cooling/warming therapies per order  - Administer medications as ordered  - Instruct and encourage patient and family to use good hand hygiene technique  - Identify and instruct in appropriate isolation precautions for identified infection/condition   Outcome: Progressing      Problem: SAFETY ADULT  Goal: Patient will remain free of falls  INTERVENTIONS:  - Assess patient frequently for physical needs  -  Identify cognitive and physical deficits and behaviors that affect risk of falls    -  [place] fall precautions as indicated by assessment   - Educate patient/family on patient safety including physical limitations  - Instruct patient to call for assistance with activity based on assessment  - Modify environment to reduce risk of injury  - Consider OT/PT consult to assist with strengthening/mobility    Outcome: Progressing    Goal: Maintain or return to baseline ADL function  INTERVENTIONS:  -  Assess patient's ability to carry out ADLs; assess patient's baseline for ADL function and identify physical deficits which impact ability to perform ADLs (bathing, care of mouth/teeth, toileting, grooming, dressing, etc )  - Assess/evaluate cause of self-care deficits   - Assess range of motion  - Assess patient's mobility; develop plan if impaired  - Assess patient's need for assistive devices and provide as appropriate  - Encourage maximum independence but intervene and supervise when necessary  ¯ Involve family in performance of ADLs  ¯ Assess for home care needs following discharge   ¯ Request OT consult to assist with ADL evaluation and planning for discharge  ¯ Provide patient education as appropriate   Outcome: Progressing    Goal: Maintain or return mobility status to optimal level  INTERVENTIONS:  - Assess patient's baseline mobility status (ambulation, transfers, stairs, etc )    - Identify cognitive and physical deficits and behaviors that affect mobility  - Identify mobility aids required to assist with transfers and/or ambulation (gait belt, sit-to-stand, lift, walker, cane, etc )  - [place] fall precautions as indicated by assessment  - Record patient progress and toleration of activity level on Mobility SBAR; progress patient to next Phase/Stage  - Instruct patient to call for assistance with activity based on assessment  - Request Rehabilitation consult to assist with strengthening/weightbearing, etc    Outcome: Progressing      Problem: DISCHARGE PLANNING  Goal: Discharge to home or other facility with appropriate resources  INTERVENTIONS:  - Identify barriers to discharge w/patient and caregiver  - Arrange for needed discharge resources and transportation as appropriate  - Identify discharge learning needs (meds, wound care, etc )  - Arrange for interpretive services to assist at discharge as needed  - Refer to Case Management Department for coordinating discharge planning if the patient needs post-hospital services based on physician/advanced practitioner order or complex needs related to functional status, cognitive ability, or social support system   Outcome: Progressing      Problem: Knowledge Deficit  Goal: Patient/family/caregiver demonstrates understanding of disease process, treatment plan, medications, and discharge instructions  Complete learning assessment and assess knowledge base  Interventions:  - Provide teaching at level of understanding  - Provide teaching via preferred learning methods   Outcome: Progressing      Problem: GENITOURINARY - ADULT  Goal: Maintains or returns to baseline urinary function  INTERVENTIONS:  - Assess urinary function  - Encourage oral fluids to ensure adequate hydration  - Administer IV fluids as ordered to ensure adequate hydration  - Administer ordered medications as needed  - Offer frequent toileting  - Follow urinary retention protocol if ordered   Outcome: Progressing    Goal: Absence of urinary retention  INTERVENTIONS:  - Assess patients ability to void and empty bladder  - Monitor I/O  - Bladder scan as needed  - Discuss with physician/AP medications to alleviate retention as needed  - Discuss catheterization for long term situations as appropriate   Outcome: Progressing      Problem: Potential for Falls  Goal: Patient will remain free of falls  INTERVENTIONS:  - Assess patient frequently for physical needs  -  Identify cognitive and physical deficits and behaviors that affect risk of falls    -  [place] fall precautions as indicated by assessment   - Educate patient/family on patient safety including physical limitations  - Instruct patient to call for assistance with activity based on assessment  - Modify environment to reduce risk of injury  - Consider OT/PT consult to assist with strengthening/mobility    Outcome: Progressing

## 2018-04-25 NOTE — ANESTHESIA PREPROCEDURE EVALUATION
Review of Systems/Medical History  Patient summary reviewed  Chart reviewed  No history of anesthetic complications     Cardiovascular  Exercise tolerance: good,     Pulmonary  Negative pulmonary ROS        GI/Hepatic  Negative GI/hepatic ROS          Kidney stones,        Endo/Other    Obesity    GYN       Hematology  Anemia iron deficiency anemia,    Comment: Microcytic anemia- most likely iron deficiency recommend Iron Panel  Musculoskeletal  Negative musculoskeletal ROS        Neurology  Negative neurology ROS      Psychology   Negative psychology ROS              Physical Exam    Airway    Mallampati score: I  TM Distance: >3 FB  Neck ROM: full     Dental   Comment: Cracked on top right and bottom left,     Cardiovascular      Pulmonary      Other Findings        Anesthesia Plan  ASA Score- 2 Emergent    Anesthesia Type- general with ASA Monitors  Additional Monitors:   Airway Plan: LMA  Plan Factors-    Induction- intravenous  Postoperative Plan- Plan for postoperative opioid use  Planned trial extubation    Informed Consent- Anesthetic plan and risks discussed with patient  I personally reviewed this patient with the CRNA  Discussed and agreed on the Anesthesia Plan with the CRNA              Lab Results   Component Value Date    WBC 10 10 04/25/2018    HGB 8 1 (L) 04/25/2018    HCT 26 3 (L) 04/25/2018    MCV 75 (L) 04/25/2018     04/25/2018     Lab Results   Component Value Date    GLUCOSE 111 04/25/2018    CALCIUM 7 8 (L) 04/25/2018     04/25/2018    K 3 9 04/25/2018    CO2 23 04/25/2018     04/25/2018    BUN 8 04/25/2018    CREATININE 0 64 04/25/2018

## 2018-04-25 NOTE — CONSULTS
UROLOGY CONSULTATION NOTE     Patient Identifiers: Yaritza Lynne (MRN [id number])  Service Requesting Consultation: [facility]  Service Providing Consultation:  Urology, Estevan Thayer PA-C    Date of Service: 2018    Reason for Consultation: ureteral stone    History of Present Illness:     Yaritza Lynne is a 40 y o  old with a history of nephrolithiasis whom presented to the ED yesterday with left flank pain  Patient states she has a history of nephrolithiasis  Denies ever requiring surgical intervention for stone removal since previous stones were small in nature  Yesterday she experienced left flank pain that radiates to the LLQ  Has associated nausea and vomiting  Denies any dysuria, gross hematuria, fevers, or chills  CT in the ED revealed 5mm left proximal ureteral stone with mild-mod hydronephrosis  UA was leukocyte and nitrite negative  She remains afebrile  Creatinine remains stable  No white count  Patient admits to continued flank pain this morning that is only being moderately controlled       Past Medical, Past Surgical History:     Past Medical History:   Diagnosis Date    Renal disorder    :    Past Surgical History:   Procedure Laterality Date     SECTION      CHOLECYSTECTOMY      TUBAL LIGATION     :    Medications, Allergies:     Current Facility-Administered Medications:     acetaminophen (TYLENOL) tablet 650 mg, 650 mg, Oral, Q6H PRN, Ilan Jalloh PA-C    HYDROmorphone (DILAUDID) injection 1 mg, 1 mg, Intravenous, Once, Mary Swartz MD    morphine injection 2 mg, 2 mg, Intravenous, Q3H PRN, Ilan Jalloh PA-C, 2 mg at 18 0725    ondansetron (ZOFRAN) injection 4 mg, 4 mg, Intravenous, Once, Mary Swartz MD    ondansetron [facility]) injection 4 mg, 4 mg, Intravenous, Q6H PRN, Ilan Jalloh PA-C    sodium chloride 0 9 % infusion, 125 mL/hr, Intravenous, Continuous, Ilan Jalloh PA-C, Last Rate: 125 mL/hr at 18 0209, 125 mL/hr at 04/25/18 0209    tamsulosin (FLOMAX) capsule 0 4 mg, 0 4 mg, Oral, Daily With Newton Skaggs PA-C, 0 4 mg at 04/25/18 0218    Allergies:  No Known Allergies:    Social and Family History:   Social History:   Social History   Substance Use Topics    Smoking status: Never Smoker    Smokeless tobacco: Never Used    Alcohol use No        History   Smoking Status    Never Smoker   Smokeless Tobacco    Never Used       Family History:  History reviewed  No pertinent family history :     Review of Systems:     General: Fever, chills, or night sweats: negative  Cardiac: Negative for chest pain  Pulmonary: Negative for shortness of breath  Gastrointestinal: Abdominal pain positive  Nausea, vomiting, or diarrhea positive,  Genitourinary: See HPI above  Patient does not have hematuria  All other systems queried were negative  Physical Exam:   General: Patient is pleasant and in NAD  Awake and alert  /67 (BP Location: Right arm)   Pulse 67   Temp 98 2 °F (36 8 °C) (Oral)   Resp 17   Ht 5' 4" (1 626 m)   Wt 90 2 kg (198 lb 13 7 oz)   LMP 04/15/2018   SpO2 99%   BMI 34 13 kg/m²   Cardiac: Peripheral edema: negative  Pulmonary: Non-labored breathing  Abdomen: Soft, non-tender, non-distended  Left CVA tenderness present  No suprapubic tenderness  Surgical scars from 3 c-sections and cholecystectomy  NELSON: None    Labs:     Lab Results   Component Value Date    HGB 8 1 (L) 04/25/2018    HCT 26 3 (L) 04/25/2018    WBC 10 10 04/25/2018     04/25/2018   ]    Lab Results   Component Value Date     04/25/2018    K 3 9 04/25/2018     04/25/2018    CO2 23 04/25/2018    BUN 8 04/25/2018    CREATININE 0 64 04/25/2018    CALCIUM 7 8 (L) 04/25/2018    GLUCOSE 111 04/25/2018   ]    Imaging:   CT ABDOMEN AND PELVIS WITH IV CONTRAST     INDICATION:   llq abd pain      COMPARISON: None      TECHNIQUE:  CT examination of the abdomen and pelvis was performed   Axial, sagittal, and coronal 2D reformatted images were created from the source data and submitted for interpretation      Radiation dose length product (DLP) for this visit:  658 mGy-cm   This examination, like all CT scans performed in the [facility], was performed utilizing techniques to minimize radiation dose exposure, including the use of iterative   reconstruction and automated exposure control      IV Contrast:  100 mL of iohexol (OMNIPAQUE)  Enteric Contrast:  Enteric contrast was not administered      FINDINGS:     ABDOMEN     LOWER CHEST:  No clinically significant abnormality identified in the visualized lower chest      LIVER/BILIARY TREE:  Enlarged fatty liver      GALLBLADDER:  Gallbladder is surgically absent      SPLEEN:  Unremarkable      PANCREAS:  Unremarkable      ADRENAL GLANDS:  Unremarkable      KIDNEYS/URETERS:  5 mm proximal left ureteral calculus best seen on 601/77 at the L3 level and resulting in mild to moderate left hydroureteronephrosis  Delayed left renal nephrogram   Additional small nonobstructive left and right renal calculi      Probable cysts also noted      STOMACH AND BOWEL:  Unremarkable      APPENDIX:  A normal appendix was visualized      ABDOMINOPELVIC CAVITY:  Minimal pelvic free fluid noted      VESSELS:  Unremarkable for patient's age      PELVIS     REPRODUCTIVE ORGANS:  Unremarkable for patient's age      URINARY BLADDER:  Unremarkable      ABDOMINAL WALL/INGUINAL REGIONS:  Unremarkable      OSSEOUS STRUCTURES:  No acute fracture or destructive osseous lesion      IMPRESSION:     1  Proximal left ureter 5 mm calculus resulting in mild to moderate left hydroureteronephrosis and delayed left nephrogram   Additional nonobstructive bilateral nephrolithiasis as above  2   Enlarged fatty liver        ASSESSMENT:     Proximal left ureter 5 mm calculus resulting in mild to moderate left    PLAN:     - reviewed spontaneous passage versus surgical intervention in the form of ureteroscopy  We discussed the risks and benefits of each of these options  - plan will be continue IVF, tamsulosin, and straining fluids  Continue pain control  - If patient's pain does not improve throughout this morning/afternoon, she will proceed to the OR for cystoscopy, left ureteroscopy, holmium laser, basket extraction, retrograde pyelogram, and left ureteral stent insertion  - remain NPO  Thank you for allowing me to participate in this patients care  Please do not hesitate to call with any additional questions    Jon Kiran PA-C

## 2018-04-25 NOTE — PROGRESS NOTES
Kelsie Abel is a 81-year-old female with left-sided flank pain  A CT scan shows a 5 mm left mid ureteral calculus  She denies any fevers or chills  Her white blood cell count is 41985  She is afebrile  She continues to have left-sided flank pain  Risk and benefits of left ureteroscopy with holmium laser lithotripsy, left retrograde pyelography, left ureteral stent insertion were discussed and reviewed  Informed consent was obtained  Urine HCG is negative  The patient understands that if I am unable to remove this stone at this time that I may need to place a left ureteral stent in the patient will require interval ureteroscopy in the future

## 2018-04-25 NOTE — PROGRESS NOTES
Trudy Damian underwent an uneventful left ureteroscopy this evening  Holmium laser lithotripsy was used to decimated the stone  A left ureteral stent was placed  As long as the patient is tolerating a diet, her pain is controlled, and vital signs are stable without fever, the patient can be discharged home from urologic standpoint  The patient should be provided with Keflex 500 mg 3 times daily for a 5 day course  She should also be given a prescription for Norco   The patient will be instructed to remove the tape, pull the string, and remove her own stent on Monday morning  Follow up in the outpatient setting with Urology recommended

## 2018-04-25 NOTE — ANESTHESIA POSTPROCEDURE EVALUATION
Post-Op Assessment Note      CV Status:  Stable    Mental Status:  Awake    Hydration Status:  Stable    PONV Controlled:  None    Airway Patency:  Patent    Post Op Vitals Reviewed: Yes          Staff: CRNA           BP   104/51   Temp   97 5   Pulse  80   Resp   12   SpO2   100% on 6LFM   Postop VS in PACU noted above, SV non-obstructed

## 2018-04-25 NOTE — PLAN OF CARE
DISCHARGE PLANNING     Discharge to home or other facility with appropriate resources Progressing        GENITOURINARY - ADULT     Maintains or returns to baseline urinary function Progressing     Absence of urinary retention Progressing        INFECTION - ADULT     Absence or prevention of progression during hospitalization Progressing        Knowledge Deficit     Patient/family/caregiver demonstrates understanding of disease process, treatment plan, medications, and discharge instructions Progressing        PAIN - ADULT     Verbalizes/displays adequate comfort level or baseline comfort level Progressing        SAFETY ADULT     Patient will remain free of falls Progressing     Maintain or return to baseline ADL function Progressing     Maintain or return mobility status to optimal level Progressing

## 2018-04-26 ENCOUNTER — TELEPHONE (OUTPATIENT)
Dept: UROLOGY | Facility: AMBULATORY SURGERY CENTER | Age: 38
End: 2018-04-26

## 2018-04-26 ENCOUNTER — TELEPHONE (OUTPATIENT)
Dept: UROLOGY | Facility: CLINIC | Age: 38
End: 2018-04-26

## 2018-04-26 VITALS
RESPIRATION RATE: 18 BRPM | OXYGEN SATURATION: 99 % | HEART RATE: 68 BPM | BODY MASS INDEX: 33.95 KG/M2 | SYSTOLIC BLOOD PRESSURE: 123 MMHG | WEIGHT: 198.85 LBS | DIASTOLIC BLOOD PRESSURE: 57 MMHG | HEIGHT: 64 IN | TEMPERATURE: 98.5 F

## 2018-04-26 DIAGNOSIS — N20.0 NEPHROLITHIASIS: Primary | ICD-10-CM

## 2018-04-26 LAB
ANION GAP SERPL CALCULATED.3IONS-SCNC: 7 MMOL/L (ref 4–13)
BUN SERPL-MCNC: 9 MG/DL (ref 5–25)
CALCIUM SERPL-MCNC: 8.2 MG/DL (ref 8.3–10.1)
CHLORIDE SERPL-SCNC: 109 MMOL/L (ref 100–108)
CO2 SERPL-SCNC: 22 MMOL/L (ref 21–32)
CREAT SERPL-MCNC: 0.84 MG/DL (ref 0.6–1.3)
ERYTHROCYTE [DISTWIDTH] IN BLOOD BY AUTOMATED COUNT: 17.1 % (ref 11.6–15.1)
GFR SERPL CREATININE-BSD FRML MDRD: 90 ML/MIN/1.73SQ M
GLUCOSE SERPL-MCNC: 125 MG/DL (ref 65–140)
GLUCOSE SERPL-MCNC: 130 MG/DL (ref 65–140)
GLUCOSE SERPL-MCNC: 266 MG/DL (ref 65–140)
HCT VFR BLD AUTO: 28.7 % (ref 34.8–46.1)
HGB BLD-MCNC: 8.6 G/DL (ref 11.5–15.4)
MCH RBC QN AUTO: 22.8 PG (ref 26.8–34.3)
MCHC RBC AUTO-ENTMCNC: 30 G/DL (ref 31.4–37.4)
MCV RBC AUTO: 76 FL (ref 82–98)
PLATELET # BLD AUTO: 398 THOUSANDS/UL (ref 149–390)
PMV BLD AUTO: 11.1 FL (ref 8.9–12.7)
POTASSIUM SERPL-SCNC: 4.2 MMOL/L (ref 3.5–5.3)
RBC # BLD AUTO: 3.78 MILLION/UL (ref 3.81–5.12)
SODIUM SERPL-SCNC: 138 MMOL/L (ref 136–145)
WBC # BLD AUTO: 12.82 THOUSAND/UL (ref 4.31–10.16)

## 2018-04-26 PROCEDURE — 82948 REAGENT STRIP/BLOOD GLUCOSE: CPT

## 2018-04-26 PROCEDURE — 99231 SBSQ HOSP IP/OBS SF/LOW 25: CPT | Performed by: UROLOGY

## 2018-04-26 PROCEDURE — 80048 BASIC METABOLIC PNL TOTAL CA: CPT | Performed by: INTERNAL MEDICINE

## 2018-04-26 PROCEDURE — 99238 HOSP IP/OBS DSCHRG MGMT 30/<: CPT | Performed by: INTERNAL MEDICINE

## 2018-04-26 PROCEDURE — 85027 COMPLETE CBC AUTOMATED: CPT | Performed by: INTERNAL MEDICINE

## 2018-04-26 RX ORDER — TAMSULOSIN HYDROCHLORIDE 0.4 MG/1
0.4 CAPSULE ORAL
Qty: 7 CAPSULE | Refills: 0 | Status: SHIPPED | OUTPATIENT
Start: 2018-04-26

## 2018-04-26 RX ORDER — ACETAMINOPHEN 325 MG/1
650 TABLET ORAL EVERY 6 HOURS PRN
Qty: 30 TABLET | Refills: 0
Start: 2018-04-26

## 2018-04-26 RX ORDER — CEPHALEXIN 500 MG/1
500 CAPSULE ORAL 3 TIMES DAILY
Qty: 15 CAPSULE | Refills: 0 | Status: SHIPPED | OUTPATIENT
Start: 2018-04-26 | End: 2018-05-01

## 2018-04-26 RX ORDER — OXYBUTYNIN CHLORIDE 5 MG/1
5 TABLET ORAL 3 TIMES DAILY
Status: DISCONTINUED | OUTPATIENT
Start: 2018-04-26 | End: 2018-04-26 | Stop reason: HOSPADM

## 2018-04-26 RX ORDER — OXYBUTYNIN CHLORIDE 5 MG/1
5 TABLET ORAL 3 TIMES DAILY PRN
Qty: 15 TABLET | Refills: 0 | Status: SHIPPED | OUTPATIENT
Start: 2018-04-26

## 2018-04-26 RX ORDER — HYDROCODONE BITARTRATE AND ACETAMINOPHEN 5; 325 MG/1; MG/1
1 TABLET ORAL EVERY 6 HOURS PRN
Qty: 20 TABLET | Refills: 0 | Status: SHIPPED | OUTPATIENT
Start: 2018-04-26 | End: 2018-05-06

## 2018-04-26 RX ADMIN — MORPHINE SULFATE 2 MG: 2 INJECTION, SOLUTION INTRAMUSCULAR; INTRAVENOUS at 10:38

## 2018-04-26 RX ADMIN — ACETAMINOPHEN 650 MG: 325 TABLET, FILM COATED ORAL at 04:53

## 2018-04-26 RX ADMIN — OXYBUTYNIN CHLORIDE 5 MG: 5 TABLET ORAL at 08:31

## 2018-04-26 RX ADMIN — HYDROCODONE BITARTRATE AND ACETAMINOPHEN 2 TABLET: 5; 325 TABLET ORAL at 08:31

## 2018-04-26 RX ADMIN — SODIUM CHLORIDE 125 ML/HR: 0.9 INJECTION, SOLUTION INTRAVENOUS at 06:27

## 2018-04-26 NOTE — PLAN OF CARE
DISCHARGE PLANNING     Discharge to home or other facility with appropriate resources Progressing        GENITOURINARY - ADULT     Maintains or returns to baseline urinary function Progressing     Absence of urinary retention Progressing        INFECTION - ADULT     Absence or prevention of progression during hospitalization Progressing        Knowledge Deficit     Patient/family/caregiver demonstrates understanding of disease process, treatment plan, medications, and discharge instructions Progressing        PAIN - ADULT     Verbalizes/displays adequate comfort level or baseline comfort level Progressing        Potential for Falls     Patient will remain free of falls Progressing        SAFETY ADULT     Patient will remain free of falls Progressing     Maintain or return to baseline ADL function Progressing     Maintain or return mobility status to optimal level Progressing

## 2018-04-26 NOTE — PROGRESS NOTES
UROLOGY PROGRESS NOTE   Patient Identifiers: Serjio Blair (MRN [id number])  Date of Service: 4/26/2018    Subjective:     24 HR EVENTS:  Proximal left ureteral 5 mm calculus POD#1 s/p cystoscopy, ureteroscopy, holmium laser, retrograde pyelogram, and left ureteral stent insertion      Patient did have 2 episodes of gross hematuria post operatively which has since resolved  Admits to stent colic with urination  Denies any nausea, vomiting, fevers, or chills  Objective:     VITALS:    Vitals:    04/26/18 0759   BP: 114/57   Pulse: 67   Resp: 18   Temp: 98 2 °F (36 8 °C)   SpO2: 99%       INS & OUTS:  I/O last 24 hours:   In: 4756 3 [I V :4706 3; IV Piggyback:50]  Out: 900 [Urine:900]    LABS:  Lab Results   Component Value Date    HGB 8 6 (L) 04/26/2018    HCT 28 7 (L) 04/26/2018    WBC 12 82 (H) 04/26/2018     (H) 04/26/2018   ]    Lab Results   Component Value Date     04/26/2018    K 4 2 04/26/2018     (H) 04/26/2018    CO2 22 04/26/2018    BUN 9 04/26/2018    CREATININE 0 84 04/26/2018    CALCIUM 8 2 (L) 04/26/2018    GLUCOSE 125 04/26/2018   ]    INPATIENT MEDS:    Current Facility-Administered Medications:     acetaminophen (TYLENOL) tablet 650 mg, 650 mg, Oral, Q6H PRN, Lyubov Gum, PA-C, 650 mg at 04/26/18 0453    HYDROcodone-acetaminophen (NORCO) 5-325 mg per tablet 2 tablet, 2 tablet, Oral, Q4H PRN, Danya Lyn MD, 2 tablet at 04/25/18 2146    HYDROmorphone (DILAUDID) injection 1 mg, 1 mg, Intravenous, Once, Mack Pickard MD    morphine injection 2 mg, 2 mg, Intravenous, Q3H PRN, Lyubov Gum, PA-C, 2 mg at 04/25/18 1536    ondansetron (ZOFRAN) injection 4 mg, 4 mg, Intravenous, Once, Mack Pickard MD    ondansetron [facility]) injection 4 mg, 4 mg, Intravenous, Q6H PRN, Lyubov Gum, PA-C    sodium chloride 0 9 % infusion, 125 mL/hr, Intravenous, Continuous, Lyubov Gum, PA-C, Last Rate: 125 mL/hr at 04/26/18 0627, 125 mL/hr at 04/26/18 4463   tamsulosin (FLOMAX) capsule 0 4 mg, 0 4 mg, Oral, Daily With Toya Geller PA-C, 0 4 mg at 04/25/18 2296      Physical Exam:     GEN: no acute distress    RESP: breathing comfortably with no accessory muscle use    ABD: soft, non-tender, non-distended   EXT: no significant peripheral edema   NELSON: None    Assessment:     Proximal left ureteral 5 mm calculus POD#1 s/p cystoscopy, ureteroscopy, holmium laser, retrograde pyelogram, and left ureteral stent insertion    Plan:   - Reviewed with patient and family that ureteral stent can be removed on Monday  There is a string attached to the stent for removal  They are comfortable performing themselves  Aware should they become uncomfortable with stent removal they can contact our office for RN assistance  - Discussed proper hydration   - Prescriptions for tamsulosin and oxybutynin provided to patient at bedside to help manage stent colic  Also encourage as needed heating pads  Prescription for Norco also provided for severe breakthrough pain  - They are aware to take Keflex 500mg 3x daily for a 5 day course  - Patient will follow up with our office in 6-8 weeks with a KUB and US prior to visit  Our office will contact them to help arrange      Urology signing off  Please do not hesitate to contact us with any further questions  Thank you for allowing us to participate in this patient's care     Sujatha Quesada PA-C

## 2018-04-26 NOTE — TELEPHONE ENCOUNTER
Patient is scheduled for follow up appointment on 6/13/18 with Francisca Osborn PA-C at 11:15  Ordered KUB and US  Will call patient to confirm once discharged

## 2018-04-26 NOTE — DISCHARGE SUMMARY
Discharge Summary - [place] [address] Internal Medicine    Patient Information: Monica Richardson 40 y o  female MRN: [id number]  Unit/Bed#: -01 Encounter: [id number]    Discharging Physician / Practitioner: Shagufta Madrigal DO  PCP: No primary care provider on file  Admission Date: 4/24/2018  Discharge Date: 04/26/18    Reason for Admission:  Flank pain    Discharge Diagnoses:     Principal Problem:    Left nephrolithiasis  Resolved Problems:    * No resolved hospital problems  *      Consultations During Hospital Stay:  · Urology    Procedures Performed:     · Cystoscopy ureteroscopy with lithotripsy, retrograde pyelogram and left ureteral stent placement    Significant Findings:     · Refer to hospital course    Incidental Findings:   ·     Test Results Pending at Discharge (will require follow up):   ·      Outpatient Tests Requested:  ·     Complications:  None    Hospital Course:     # Abd/flank pain - secondary to nephrolithiasis s/p cystoscopy, ureteroscopy with lithotripsy, retrograde pyelogram and left ureteral stent placement; pod 1  - ok to d/c home per urology  - Discharged on prn pain analgesics/flomax , prn oxybutyin and keflex x 5 days  - Patient and her  (he is a RN) instructed per urology on stent removal which is to be done on Monday  - Pt clinically improved, no hematuria and resolved flank pain  - Pt to f/u with urology and will be arranged for KUB and renal US prior to visit     # Anemia, chronic hx but acute drop likely component of hemodilution, no sign of acute blood loss  H/H remains stable     # Leukocytosis - likely d/t nephrolithiasis; u/a benign    Disp: Plan of care discussed with patient and her  at length  D/c to home  Condition at Discharge: stable     Discharge Day Visit / Exam:     Subjective:  Patient seen and examined at bedside  Mild discomfort as a result of stent but otherwise states resolved flank pain   Afebrile, nontoxic appearing    Vitals: Blood Pressure: 114/57 (04/26/18 0759)  Pulse: 67 (04/26/18 0759)  Temperature: 98 2 °F (36 8 °C) (04/26/18 0759)  Temp Source: Oral (04/26/18 0759)  Respirations: 18 (04/26/18 0759)  Height: 5' 4" (162 6 cm) (04/25/18 0128)  Weight - Scale: 90 2 kg (198 lb 13 7 oz) (04/25/18 0128)  SpO2: 99 % (04/26/18 0759)  Exam:   Physical Exam   Constitutional: She is oriented to person, place, and time  She appears well-developed and well-nourished  Neck: Neck supple  Cardiovascular: Normal rate, regular rhythm, normal heart sounds and intact distal pulses  Exam reveals no gallop and no friction rub  No murmur heard  Pulmonary/Chest: Effort normal and breath sounds normal  No respiratory distress  She has no wheezes  She has no rales  She exhibits no tenderness  Abdominal: Soft  Bowel sounds are normal  She exhibits no distension  There is no tenderness  There is no rebound and no guarding  Genitourinary:   Genitourinary Comments: Resolved left CVA tenderness   Musculoskeletal: Normal range of motion  She exhibits no edema, tenderness or deformity  Neurological: She is alert and oriented to person, place, and time  She has normal reflexes  She displays normal reflexes  No cranial nerve deficit  She exhibits normal muscle tone  Coordination normal    Skin: Skin is warm and dry  Discharge instructions/Information to patient and family:   See after visit summary for information provided to patient and family  Provisions for Follow-Up Care:  See after visit summary for information related to follow-up care and any pertinent home health orders  Disposition:     Home    For Discharges to [facility]:   · Not Applicable to this Patient - Not Applicable to this Patient    Planned Readmission:  No     Discharge Statement:  I spent less than 30 minutes discharging the patient  This time was spent on the day of discharge  I had direct contact with the patient on the day of discharge   Greater than 50% of the total time was spent examining patient, answering all patient questions, arranging and discussing plan of care with patient as well as directly providing post-discharge instructions  Additional time then spent on discharge activities  Discharge Medications:  See after visit summary for reconciled discharge medications provided to patient and family  ** Please Note: Dragon 360 Dictation voice to text software may have been used in the creation of this document   **

## 2018-04-26 NOTE — TELEPHONE ENCOUNTER
Patient is currently at [facility]  She is POD#1 s/p ureteroscopy and stent with Dr Yakelin Vogt  Family plans to remove stent themselves at home on Monday  Patient will need to follow up with our office in 6-8 weeks with a KUB and US of kidney and bladder prior to visit  Please contact them once discharged to arrange follow up  Thank you

## 2018-04-26 NOTE — TELEPHONE ENCOUNTER
I phoned patient regarding stent removal   Specific instructions were given for removal 4-5 days from surgery  Patient encouraged to drink plenty of water  She stated that her urine is back to being yellow in color   She verbalized understanding and her  was also given instructions for removal

## 2018-04-27 ENCOUNTER — HOSPITAL ENCOUNTER (EMERGENCY)
Facility: HOSPITAL | Age: 38
Discharge: HOME | End: 2018-04-28
Attending: EMERGENCY MEDICINE | Admitting: EMERGENCY MEDICINE

## 2018-04-27 DIAGNOSIS — R51.9 ACUTE HEADACHE: Primary | ICD-10-CM

## 2018-04-27 LAB — BACTERIA UR CULT: NORMAL

## 2018-04-27 PROCEDURE — 36415 COLL VENOUS BLD VENIPUNCTURE: CPT

## 2018-04-27 PROCEDURE — 96374 THER/PROPH/DIAG INJ IV PUSH: CPT

## 2018-04-27 PROCEDURE — 85025 COMPLETE CBC W/AUTO DIFF WBC: CPT | Performed by: EMERGENCY MEDICINE

## 2018-04-27 PROCEDURE — 80053 COMPREHEN METABOLIC PANEL: CPT | Performed by: EMERGENCY MEDICINE

## 2018-04-27 RX ORDER — ONDANSETRON 2 MG/ML
4 INJECTION INTRAMUSCULAR; INTRAVENOUS ONCE
Status: COMPLETED | OUTPATIENT
Start: 2018-04-28 | End: 2018-04-27

## 2018-04-27 RX ADMIN — ONDANSETRON 4 MG: 2 INJECTION INTRAMUSCULAR; INTRAVENOUS at 23:57

## 2018-04-27 NOTE — TELEPHONE ENCOUNTER
Called and spoke to patient  Patient confirmed appointment for 6/13/18 at 11:15  Patient aware that she needs a KUB and US prior to visit  Patient was given central scheduling's phone number to call and have the KUB and US scheduled prior to her appointment  Patient verbalizes understanding  Patient denies any other questions at this time  Patient will call the office back if she has any other questions

## 2018-04-28 VITALS
RESPIRATION RATE: 14 BRPM | TEMPERATURE: 98.5 F | HEIGHT: 64 IN | OXYGEN SATURATION: 96 % | SYSTOLIC BLOOD PRESSURE: 122 MMHG | WEIGHT: 204 LBS | BODY MASS INDEX: 34.83 KG/M2 | DIASTOLIC BLOOD PRESSURE: 72 MMHG | HEART RATE: 71 BPM

## 2018-04-28 LAB
ALBUMIN SERPL BCP-MCNC: 2.8 G/DL (ref 3.5–5)
ALP SERPL-CCNC: 87 U/L (ref 46–116)
ALT SERPL W P-5'-P-CCNC: 35 U/L (ref 12–78)
ANION GAP SERPL CALCULATED.3IONS-SCNC: 10 MMOL/L (ref 4–13)
AST SERPL W P-5'-P-CCNC: 20 U/L (ref 5–45)
BASOPHILS # BLD AUTO: 0.05 THOUSANDS/ΜL (ref 0–0.1)
BASOPHILS NFR BLD AUTO: 1 % (ref 0–1)
BILIRUB SERPL-MCNC: 0.2 MG/DL (ref 0.2–1)
BUN SERPL-MCNC: 8 MG/DL (ref 5–25)
CALCIUM SERPL-MCNC: 8.1 MG/DL (ref 8.3–10.1)
CHLORIDE SERPL-SCNC: 105 MMOL/L (ref 100–108)
CO2 SERPL-SCNC: 25 MMOL/L (ref 21–32)
CREAT SERPL-MCNC: 0.9 MG/DL (ref 0.6–1.3)
EOSINOPHIL # BLD AUTO: 0.1 THOUSAND/ΜL (ref 0–0.61)
EOSINOPHIL NFR BLD AUTO: 1 % (ref 0–6)
ERYTHROCYTE [DISTWIDTH] IN BLOOD BY AUTOMATED COUNT: 17.5 % (ref 11.6–15.1)
GFR SERPL CREATININE-BSD FRML MDRD: 83 ML/MIN/1.73SQ M
GLUCOSE SERPL-MCNC: 97 MG/DL (ref 65–140)
HCT VFR BLD AUTO: 29.6 % (ref 34.8–46.1)
HGB BLD-MCNC: 9.3 G/DL (ref 11.5–15.4)
LYMPHOCYTES # BLD AUTO: 2.36 THOUSANDS/ΜL (ref 0.6–4.47)
LYMPHOCYTES NFR BLD AUTO: 22 % (ref 14–44)
MCH RBC QN AUTO: 23.4 PG (ref 26.8–34.3)
MCHC RBC AUTO-ENTMCNC: 31.4 G/DL (ref 31.4–37.4)
MCV RBC AUTO: 75 FL (ref 82–98)
MONOCYTES # BLD AUTO: 0.91 THOUSAND/ΜL (ref 0.17–1.22)
MONOCYTES NFR BLD AUTO: 9 % (ref 4–12)
NEUTROPHILS # BLD AUTO: 7.28 THOUSANDS/ΜL (ref 1.85–7.62)
NEUTS SEG NFR BLD AUTO: 68 % (ref 43–75)
NRBC BLD AUTO-RTO: 0 /100 WBCS
PLATELET # BLD AUTO: 409 THOUSANDS/UL (ref 149–390)
PMV BLD AUTO: 11.1 FL (ref 8.9–12.7)
POTASSIUM SERPL-SCNC: 3.1 MMOL/L (ref 3.5–5.3)
PROT SERPL-MCNC: 7 G/DL (ref 6.4–8.2)
RBC # BLD AUTO: 3.97 MILLION/UL (ref 3.81–5.12)
SODIUM SERPL-SCNC: 140 MMOL/L (ref 136–145)
WBC # BLD AUTO: 10.73 THOUSAND/UL (ref 4.31–10.16)

## 2018-04-28 PROCEDURE — 96361 HYDRATE IV INFUSION ADD-ON: CPT

## 2018-04-28 PROCEDURE — 99284 EMERGENCY DEPT VISIT MOD MDM: CPT

## 2018-04-28 PROCEDURE — 96375 TX/PRO/DX INJ NEW DRUG ADDON: CPT

## 2018-04-28 RX ORDER — METOCLOPRAMIDE HYDROCHLORIDE 5 MG/ML
10 INJECTION INTRAMUSCULAR; INTRAVENOUS ONCE
Status: COMPLETED | OUTPATIENT
Start: 2018-04-28 | End: 2018-04-28

## 2018-04-28 RX ORDER — DIPHENHYDRAMINE HYDROCHLORIDE 50 MG/ML
25 INJECTION INTRAMUSCULAR; INTRAVENOUS ONCE
Status: COMPLETED | OUTPATIENT
Start: 2018-04-28 | End: 2018-04-28

## 2018-04-28 RX ORDER — KETOROLAC TROMETHAMINE 30 MG/ML
15 INJECTION, SOLUTION INTRAMUSCULAR; INTRAVENOUS ONCE
Status: COMPLETED | OUTPATIENT
Start: 2018-04-28 | End: 2018-04-28

## 2018-04-28 RX ADMIN — SODIUM CHLORIDE 1000 ML: 0.9 INJECTION, SOLUTION INTRAVENOUS at 00:59

## 2018-04-28 RX ADMIN — METOCLOPRAMIDE 10 MG: 5 INJECTION, SOLUTION INTRAMUSCULAR; INTRAVENOUS at 00:59

## 2018-04-28 RX ADMIN — DIPHENHYDRAMINE HYDROCHLORIDE 25 MG: 50 INJECTION, SOLUTION INTRAMUSCULAR; INTRAVENOUS at 00:59

## 2018-04-28 RX ADMIN — KETOROLAC TROMETHAMINE 15 MG: 30 INJECTION, SOLUTION INTRAMUSCULAR at 00:58

## 2018-04-28 NOTE — ED PROVIDER NOTES
History  Chief Complaint   Patient presents with    Headache - New Onset or New Symptoms     Pt presents to ED for headache, n/v starting today  Pt is 2 days post-op from cystoscopy      HPI    Prior to Admission Medications   Prescriptions Last Dose Informant Patient Reported? Taking? HYDROcodone-acetaminophen (NORCO) 5-325 mg per tablet   No No   Sig: Take 1 tablet by mouth every 6 (six) hours as needed for pain for up to 10 days Max Daily Amount: 4 tablets   acetaminophen (TYLENOL) 325 mg tablet   No No   Sig: Take 2 tablets (650 mg total) by mouth every 6 (six) hours as needed for mild pain   cephalexin (KEFLEX) 500 mg capsule   No No   Sig: Take 1 capsule (500 mg total) by mouth 3 (three) times a day for 5 days   oxybutynin (DITROPAN) 5 mg tablet   No No   Sig: Take 1 tablet (5 mg total) by mouth 3 (three) times a day as needed (stent discomfort)   tamsulosin (FLOMAX) 0 4 mg   No No   Sig: Take 1 capsule (0 4 mg total) by mouth daily with dinner      Facility-Administered Medications: None       Past Medical History:   Diagnosis Date    Renal disorder        Past Surgical History:   Procedure Laterality Date     SECTION      CHOLECYSTECTOMY      OH CYSTO/URETERO W/LITHOTRIPSY &INDWELL STENT INSRT Left 2018    Procedure: CYSTOSCOPY URETEROSCOPY WITH LITHOTRIPSY HOLMIUM LASER, RETROGRADE PYELOGRAM AND INSERTION STENT URETERAL;  Surgeon: Yessenia Mcdonough MD;  Location: [facility];  Service: Urology    TUBAL LIGATION         History reviewed  No pertinent family history  I have reviewed and agree with the history as documented      Social History   Substance Use Topics    Smoking status: Never Smoker    Smokeless tobacco: Never Used    Alcohol use No        Review of Systems    Physical Exam  ED Triage Vitals [18 2323]   Temperature Pulse Respirations Blood Pressure SpO2   98 5 °F (36 9 °C) 80 17 137/79 98 %      Temp Source Heart Rate Source Patient Position - Orthostatic VS BP Location FiO2 (%)   Oral Monitor Sitting Right arm --      Pain Score       8           Orthostatic Vital Signs  Vitals:    04/27/18 2323 04/28/18 0149   BP: 137/79 122/72   Pulse: 80 71   Patient Position - Orthostatic VS: Sitting Lying       Physical Exam   Constitutional: She is oriented to person, place, and time  She appears well-developed and well-nourished  No distress  HENT:   Head: Normocephalic and atraumatic  Eyes: Conjunctivae and EOM are normal  Pupils are equal, round, and reactive to light  Neck: Normal range of motion  Muscular tenderness (mild) present  No tracheal deviation present  Cardiovascular: Normal rate, regular rhythm, normal heart sounds and intact distal pulses  Pulmonary/Chest: Effort normal and breath sounds normal  No respiratory distress  Abdominal: Soft  She exhibits no distension  There is no tenderness  Musculoskeletal:   Shortening of left arm compared to right with no left hand   Neurological: She is alert and oriented to person, place, and time  She has normal strength  No cranial nerve deficit or sensory deficit  Coordination normal  GCS eye subscore is 4  GCS verbal subscore is 5  GCS motor subscore is 6  Reflex Scores:       Bicep reflexes are 2+ on the right side and 2+ on the left side  Brachioradialis reflexes are 2+ on the right side and 2+ on the left side  Patellar reflexes are 2+ on the right side and 2+ on the left side  Skin: Skin is warm and dry  Psychiatric: She has a normal mood and affect  Her behavior is normal    Nursing note and vitals reviewed        ED Medications  Medications   ondansetron (ZOFRAN) injection 4 mg (4 mg Intravenous Given 4/27/18 2737)   metoclopramide (REGLAN) injection 10 mg (10 mg Intravenous Given 4/28/18 0059)   diphenhydrAMINE (BENADRYL) injection 25 mg (25 mg Intravenous Given 4/28/18 0059)   ketorolac (TORADOL) injection 15 mg (15 mg Intravenous Given 4/28/18 0058)   sodium chloride 0 9 % bolus 1,000 mL (0 mL Intravenous Stopped 4/28/18 0230)       Diagnostic Studies  Results Reviewed     Procedure Component Value Units Date/Time    Comprehensive metabolic panel [[id number]]  (Abnormal) Collected:  04/27/18 2357    Lab Status:  Final result Specimen:  Blood from Arm, Right Updated:  04/28/18 0030     Sodium 140 mmol/L      Potassium 3 1 (L) mmol/L      Chloride 105 mmol/L      CO2 25 mmol/L      Anion Gap 10 mmol/L      BUN 8 mg/dL      Creatinine 0 90 mg/dL      Glucose 97 mg/dL      Calcium 8 1 (L) mg/dL      AST 20 U/L      ALT 35 U/L      Alkaline Phosphatase 87 U/L      Total Protein 7 0 g/dL      Albumin 2 8 (L) g/dL      Total Bilirubin 0 20 mg/dL      eGFR 83 ml/min/1 73sq m     Narrative:         National Kidney Disease Education Program recommendations are as follows:  GFR calculation is accurate only with a steady state creatinine  Chronic Kidney disease less than 60 ml/min/1 73 sq  meters  Kidney failure less than 15 ml/min/1 73 sq  meters      CBC and differential [[id number]]  (Abnormal) Collected:  04/27/18 2357    Lab Status:  Final result Specimen:  Blood from Arm, Right Updated:  04/28/18 0004     WBC 10 73 (H) Thousand/uL      RBC 3 97 Million/uL      Hemoglobin 9 3 (L) g/dL      Hematocrit 29 6 (L) %      MCV 75 (L) fL      MCH 23 4 (L) pg      MCHC 31 4 g/dL      RDW 17 5 (H) %      MPV 11 1 fL      Platelets 056 (H) Thousands/uL      nRBC 0 /100 WBCs      Neutrophils Relative 68 %      Lymphocytes Relative 22 %      Monocytes Relative 9 %      Eosinophils Relative 1 %      Basophils Relative 1 %      Neutrophils Absolute 7 28 Thousands/µL      Lymphocytes Absolute 2 36 Thousands/µL      Monocytes Absolute 0 91 Thousand/µL      Eosinophils Absolute 0 10 Thousand/µL      Basophils Absolute 0 05 Thousands/µL                  No orders to display              Procedures  Procedures       Phone Contacts  ED Phone Contact    ED Course                               MDM  Number of Diagnoses or Management Options  Acute headache: new and does not require workup  Diagnosis management comments: This is a 42-year-old female presents here today with headache, nausea and vomiting  She states at about 2000 she began having a throbbing left-sided headache that radiated posteriorly  This began worsening over time  She tried to take one of the Cora Cori she had recently been prescribed after stent placement for kidney stone when she vomited it back up  She did not try any other medications to help her headache  She states she woke from sleep with worsening nausea and vomited two more times  She states the headache has gradually progressed in severity over time  She endorses photophobia  She denies phonophobia, vision changes, tinnitus, focal weakness or numbness, gait instability  She denies any recent injuries to her head  She has not been taking Flomax, because was having borderline low blood pressures while in the hospital and was told this could make her blood pressure worse  She has been taking the other medications as prescribed  She denies recent fevers, nasal congestion, head injuries or other infectious symptoms  She has had typical mild headaches previously, but states they have never been this bad previously  She does endorse recent significant stressors, exacerbated by missing several days a work because of being in the hospital, and the fact that she is the primary breadwinner for her family  ROS: Otherwise negative, unless stated as above  She is well-appearing, in no acute distress  Her exam is unremarkable without focal neurologic deficits  This sounds most typical of migrainous type headache or other benign headache  She does not have sudden onset or any risk factors for intracranial hemorrhage or subarachnoid for which I think amount imaging is indicated  We will treat her headache here    Blood work was obtained by nursing prior to the patient being seen by myself, and was not clinically indicated for the patient based on presentation  She does have an anemia slightly improved from her recent discharge, and her labs are otherwise unremarkable  She does feel better after medications  I discussed with patient and  treatment at home, follow-up, and indications for return, and they expressed understanding with this plan  CritCare Time    Disposition  Final diagnoses:   Acute headache     Time reflects when diagnosis was documented in both MDM as applicable and the Disposition within this note     Time User Action Codes Description Comment    4/28/2018  2:32 AM Radha Almendarez Add [R51] Acute headache       ED Disposition     ED Disposition Condition Comment    Discharge  Skye Demarco discharge to home/self care  Condition at discharge: Good        Follow-up Information     Follow up With Specialties Details Why Contact Info    your primary care doctor  Schedule an appointment as soon as possible for a visit in 1 week to follow up on your headache     Jimenez Bazan MD Internal Medicine Schedule an appointment as soon as possible for a visit to set up care with a primary care doctor if you do not have one [id number] [address]  [phone]          Patient's Medications   Discharge Prescriptions    No medications on file     No discharge procedures on file      ED Provider  Electronically Signed by           Roma Bear MD  04/28/18 9855

## 2018-04-28 NOTE — DISCHARGE INSTRUCTIONS
Take ibuprofen (Motrin, Advil) or acetaminophen (Tylenol) as needed for headaches, as per the instructions  Follow up the primary care doctor for further evaluation of your headaches  Acute Headache, Ambulatory Care   GENERAL INFORMATION:   An acute headache  is pain or discomfort that starts suddenly and gets worse quickly  The cause of an acute headache may not be known  It may be triggered by stress, fatigue, hormones, food, or trauma  Common related symptoms include the following:   · Fever    · Sinus pressure    · Loss of memory    · Nausea or vomiting    · Problems with your vision, such as watery or red eyes, loss of vision, or pain in bright light    · Stiff neck    · Tenderness of the head and neck area    · Trouble staying awake, or being less alert than usual     · Weakness or less energy  Seek immediate care for the following symptoms:   · Severe pain    · A headache that occurs after a blow to the head, a fall, or other trauma     · Confusion or forgetfulness    · Numbness on one side of your face or body  Treatment for an acute headache  may include medicine to decrease pain  You may also need biofeedback or cognitive behavioral therapy  Ask your healthcare provider about these and other treatments for an acute headache  Manage my symptoms:   · Apply heat  on your head for 20 to 30 minutes every 2 hours for as many days as directed  Heat helps decrease pain and muscle spasms  You may alternate heat and ice  · Apply ice  on your head for 15 to 20 minutes every hour or as directed  Use an ice pack, or put crushed ice in a plastic bag  Cover it with a towel  Ice helps decrease pain  · Relax your muscles  Lie down in a comfortable position and close your eyes  Relax your muscles slowly  Start at your toes and work your way up your body  · Keep a record of your headaches  Write down when your headaches start and stop  Include your symptoms and what you were doing when the headache began  Record what you ate or drank for 24 hours before the headache started  Describe the pain and where it hurts  Keep track of what you did to treat your headache and whether it worked  Follow up with your healthcare provider as directed:  Bring your headache record with you when you see your healthcare provider  Write down your questions so you remember to ask them during your visits  CARE AGREEMENT:   You have the right to help plan your care  Learn about your health condition and how it may be treated  Discuss treatment options with your caregivers to decide what care you want to receive  You always have the right to refuse treatment  The above information is an  only  It is not intended as medical advice for individual conditions or treatments  Talk to your doctor, nurse or pharmacist before following any medical regimen to see if it is safe and effective for you  © 2014 [address] is for End User's use only and may not be sold, redistributed or otherwise used for commercial purposes  All illustrations and images included in CareNotes® are the copyrighted property of [employer]  or [employer] Prince

## 2018-04-28 NOTE — ED NOTES
Pt is in bed, appears to be sleeping  No s/s of acute distress, side rails are up, call bell with in reach  Will continue to monitor        Tomy Trimble RN  04/28/18 8848

## 2018-04-29 ENCOUNTER — HOSPITAL ENCOUNTER (EMERGENCY)
Facility: HOSPITAL | Age: 38
Discharge: HOME | End: 2018-04-29
Attending: EMERGENCY MEDICINE | Admitting: EMERGENCY MEDICINE

## 2018-04-29 ENCOUNTER — APPOINTMENT (EMERGENCY)
Dept: CT IMAGING | Facility: HOSPITAL | Age: 38
End: 2018-04-29

## 2018-04-29 VITALS
HEART RATE: 68 BPM | DIASTOLIC BLOOD PRESSURE: 63 MMHG | RESPIRATION RATE: 16 BRPM | TEMPERATURE: 97.7 F | SYSTOLIC BLOOD PRESSURE: 119 MMHG | OXYGEN SATURATION: 100 %

## 2018-04-29 DIAGNOSIS — R10.9 LEFT FLANK PAIN: Primary | ICD-10-CM

## 2018-04-29 LAB
ALBUMIN SERPL BCP-MCNC: 3.4 G/DL (ref 3.5–5)
ALP SERPL-CCNC: 105 U/L (ref 46–116)
ALT SERPL W P-5'-P-CCNC: 34 U/L (ref 12–78)
ANION GAP SERPL CALCULATED.3IONS-SCNC: 14 MMOL/L (ref 4–13)
AST SERPL W P-5'-P-CCNC: 44 U/L (ref 5–45)
BACTERIA UR QL AUTO: ABNORMAL /HPF
BASOPHILS # BLD AUTO: 0.06 THOUSANDS/ΜL (ref 0–0.1)
BASOPHILS NFR BLD AUTO: 1 % (ref 0–1)
BILIRUB SERPL-MCNC: 0.3 MG/DL (ref 0.2–1)
BILIRUB UR QL STRIP: NEGATIVE
BUN SERPL-MCNC: 11 MG/DL (ref 5–25)
CALCIUM SERPL-MCNC: 9.6 MG/DL (ref 8.3–10.1)
CHLORIDE SERPL-SCNC: 101 MMOL/L (ref 100–108)
CLARITY UR: ABNORMAL
CLARITY, POC: CLEAR
CO2 SERPL-SCNC: 22 MMOL/L (ref 21–32)
COLOR UR: YELLOW
COLOR, POC: YELLOW
CREAT SERPL-MCNC: 0.82 MG/DL (ref 0.6–1.3)
EOSINOPHIL # BLD AUTO: 0.09 THOUSAND/ΜL (ref 0–0.61)
EOSINOPHIL NFR BLD AUTO: 1 % (ref 0–6)
ERYTHROCYTE [DISTWIDTH] IN BLOOD BY AUTOMATED COUNT: 17.5 % (ref 11.6–15.1)
EXT BILIRUBIN, UA: NORMAL
EXT BLOOD URINE: NORMAL
EXT GLUCOSE, UA: NEGATIVE
EXT KETONES: NEGATIVE
EXT NITRITE, UA: NORMAL
EXT PH, UA: 6.5
EXT PREG TEST URINE: NEGATIVE
EXT PROTEIN, UA: NORMAL
EXT SPECIFIC GRAVITY, UA: 1.02
EXT UROBILINOGEN: NEGATIVE
GFR SERPL CREATININE-BSD FRML MDRD: 92 ML/MIN/1.73SQ M
GLUCOSE SERPL-MCNC: 108 MG/DL (ref 65–140)
GLUCOSE UR STRIP-MCNC: NEGATIVE MG/DL
HCT VFR BLD AUTO: 34.2 % (ref 34.8–46.1)
HGB BLD-MCNC: 10.6 G/DL (ref 11.5–15.4)
HGB UR QL STRIP.AUTO: ABNORMAL
KETONES UR STRIP-MCNC: NEGATIVE MG/DL
LEUKOCYTE ESTERASE UR QL STRIP: ABNORMAL
LIPASE SERPL-CCNC: 69 U/L (ref 73–393)
LYMPHOCYTES # BLD AUTO: 2.67 THOUSANDS/ΜL (ref 0.6–4.47)
LYMPHOCYTES NFR BLD AUTO: 22 % (ref 14–44)
MCH RBC QN AUTO: 22.8 PG (ref 26.8–34.3)
MCHC RBC AUTO-ENTMCNC: 31 G/DL (ref 31.4–37.4)
MCV RBC AUTO: 74 FL (ref 82–98)
MONOCYTES # BLD AUTO: 0.75 THOUSAND/ΜL (ref 0.17–1.22)
MONOCYTES NFR BLD AUTO: 6 % (ref 4–12)
NEUTROPHILS # BLD AUTO: 8.8 THOUSANDS/ΜL (ref 1.85–7.62)
NEUTS SEG NFR BLD AUTO: 71 % (ref 43–75)
NITRITE UR QL STRIP: NEGATIVE
NON-SQ EPI CELLS URNS QL MICRO: ABNORMAL /HPF
NRBC BLD AUTO-RTO: 0 /100 WBCS
PH UR STRIP.AUTO: 6.5 [PH] (ref 4.5–8)
PLATELET # BLD AUTO: 439 THOUSANDS/UL (ref 149–390)
PMV BLD AUTO: 11.4 FL (ref 8.9–12.7)
POTASSIUM SERPL-SCNC: 3.8 MMOL/L (ref 3.5–5.3)
PROT SERPL-MCNC: 8.4 G/DL (ref 6.4–8.2)
PROT UR STRIP-MCNC: ABNORMAL MG/DL
RBC # BLD AUTO: 4.65 MILLION/UL (ref 3.81–5.12)
RBC #/AREA URNS AUTO: ABNORMAL /HPF
SODIUM SERPL-SCNC: 137 MMOL/L (ref 136–145)
SP GR UR STRIP.AUTO: 1.02 (ref 1–1.03)
UROBILINOGEN UR QL STRIP.AUTO: 0.2 E.U./DL
WBC # BLD AUTO: 12.41 THOUSAND/UL (ref 4.31–10.16)
WBC # BLD EST: NEGATIVE 10*3/UL
WBC #/AREA URNS AUTO: ABNORMAL /HPF

## 2018-04-29 PROCEDURE — 36415 COLL VENOUS BLD VENIPUNCTURE: CPT

## 2018-04-29 PROCEDURE — 74176 CT ABD & PELVIS W/O CONTRAST: CPT

## 2018-04-29 PROCEDURE — 85025 COMPLETE CBC W/AUTO DIFF WBC: CPT | Performed by: EMERGENCY MEDICINE

## 2018-04-29 PROCEDURE — 81025 URINE PREGNANCY TEST: CPT | Performed by: PHYSICIAN ASSISTANT

## 2018-04-29 PROCEDURE — 81002 URINALYSIS NONAUTO W/O SCOPE: CPT | Performed by: PHYSICIAN ASSISTANT

## 2018-04-29 PROCEDURE — 99284 EMERGENCY DEPT VISIT MOD MDM: CPT

## 2018-04-29 PROCEDURE — 80053 COMPREHEN METABOLIC PANEL: CPT | Performed by: EMERGENCY MEDICINE

## 2018-04-29 PROCEDURE — 81001 URINALYSIS AUTO W/SCOPE: CPT | Performed by: PHYSICIAN ASSISTANT

## 2018-04-29 PROCEDURE — 96374 THER/PROPH/DIAG INJ IV PUSH: CPT

## 2018-04-29 PROCEDURE — 83690 ASSAY OF LIPASE: CPT | Performed by: EMERGENCY MEDICINE

## 2018-04-29 PROCEDURE — 96375 TX/PRO/DX INJ NEW DRUG ADDON: CPT

## 2018-04-29 RX ORDER — ONDANSETRON 2 MG/ML
4 INJECTION INTRAMUSCULAR; INTRAVENOUS ONCE
Status: COMPLETED | OUTPATIENT
Start: 2018-04-29 | End: 2018-04-29

## 2018-04-29 RX ADMIN — HYDROMORPHONE HYDROCHLORIDE 1 MG: 1 INJECTION, SOLUTION INTRAMUSCULAR; INTRAVENOUS; SUBCUTANEOUS at 19:43

## 2018-04-29 RX ADMIN — ONDANSETRON 4 MG: 2 INJECTION INTRAMUSCULAR; INTRAVENOUS at 20:48

## 2018-04-29 NOTE — ED PROVIDER NOTES
History  Chief Complaint   Patient presents with    Flank Pain     pt presents via hospital wheelchair with c/o L flank pain, recently had urteroscopy to remove stone, today removed stent and immediately felt sharp L flank pain  passing minimal amounts of urine      15-year-old female with left flank pain after removing ureteral stent  She recently had stent placed for stone and had lithotripsy at that time as well  She was instructed to remove the stent today  Within 20 minutes of doing so she had severe onset left-sided flank pain  Constant since that time  Nothing she does makes it better or worse  She has never had this pain before  Feels different when she had a kidney stone  She urinated since that time and has had no difficulty urinating and no pain with urination  No blood in the urine  No fevers chills nausea vomiting  No abdominal pain, only left flank pain  History provided by:  Patient   used: No    Flank Pain   Pain location:  L flank  Pain quality: stabbing    Pain radiates to:  Does not radiate  Pain severity:  Severe  Onset quality:  Sudden  Duration:  1 hour  Timing:  Constant  Progression:  Unchanged  Chronicity:  New  Context comment:  After pulling out ureteral stent on that affected side  Relieved by:  Nothing  Worsened by:  Nothing  Ineffective treatments:  None tried  Associated symptoms: no anorexia, no belching, no chest pain, no chills, no constipation, no cough, no diarrhea, no dysuria, no fatigue, no fever, no flatus, no hematemesis, no hematochezia, no hematuria, no melena, no nausea, no shortness of breath, no sore throat, no vaginal bleeding, no vaginal discharge and no vomiting    Risk factors: obesity and recent hospitalization    Risk factors: no alcohol abuse, no aspirin use, not elderly and not pregnant        Prior to Admission Medications   Prescriptions Last Dose Informant Patient Reported? Taking?    HYDROcodone-acetaminophen ([address]) 5-325 mg per tablet   No Yes   Sig: Take 1 tablet by mouth every 6 (six) hours as needed for pain for up to 10 days Max Daily Amount: 4 tablets   acetaminophen (TYLENOL) 325 mg tablet   No Yes   Sig: Take 2 tablets (650 mg total) by mouth every 6 (six) hours as needed for mild pain   cephalexin (KEFLEX) 500 mg capsule   No Yes   Sig: Take 1 capsule (500 mg total) by mouth 3 (three) times a day for 5 days   oxybutynin (DITROPAN) 5 mg tablet   No Yes   Sig: Take 1 tablet (5 mg total) by mouth 3 (three) times a day as needed (stent discomfort)   tamsulosin (FLOMAX) 0 4 mg   No Yes   Sig: Take 1 capsule (0 4 mg total) by mouth daily with dinner      Facility-Administered Medications: None       Past Medical History:   Diagnosis Date    Renal disorder        Past Surgical History:   Procedure Laterality Date     SECTION      CHOLECYSTECTOMY      GA CYSTO/URETERO W/LITHOTRIPSY &INDWELL STENT INSRT Left 2018    Procedure: CYSTOSCOPY URETEROSCOPY WITH LITHOTRIPSY HOLMIUM LASER, RETROGRADE PYELOGRAM AND INSERTION STENT URETERAL;  Surgeon: Clay Dodson MD;  Location: [facility];  Service: Urology    TUBAL LIGATION         No family history on file  I have reviewed and agree with the history as documented  Social History   Substance Use Topics    Smoking status: Never Smoker    Smokeless tobacco: Never Used    Alcohol use No        Review of Systems   Constitutional: Negative for activity change, appetite change, chills, diaphoresis, fatigue, fever and unexpected weight change  HENT: Negative for congestion, rhinorrhea, sinus pressure, sore throat and trouble swallowing  Eyes: Negative for photophobia and visual disturbance  Respiratory: Negative for apnea, cough, choking, chest tightness, shortness of breath, wheezing and stridor  Cardiovascular: Negative for chest pain, palpitations and leg swelling     Gastrointestinal: Negative for abdominal distention, abdominal pain, anorexia, blood in stool, constipation, diarrhea, flatus, hematemesis, hematochezia, melena, nausea and vomiting  Genitourinary: Positive for flank pain  Negative for decreased urine volume, difficulty urinating, dysuria, enuresis, frequency, hematuria, urgency, vaginal bleeding and vaginal discharge  Musculoskeletal: Negative for arthralgias, myalgias, neck pain and neck stiffness  Skin: Negative for color change, pallor, rash and wound  Allergic/Immunologic: Negative  Neurological: Negative for dizziness, tremors, syncope, weakness, light-headedness, numbness and headaches  Hematological: Negative  Psychiatric/Behavioral: Negative  All other systems reviewed and are negative  Physical Exam  ED Triage Vitals   Temperature Pulse Respirations Blood Pressure SpO2   04/29/18 1908 04/29/18 1908 04/29/18 1908 04/29/18 1908 04/29/18 1908   97 7 °F (36 5 °C) 86 18 136/80 100 %      Temp Source Heart Rate Source Patient Position - Orthostatic VS BP Location FiO2 (%)   04/29/18 1908 04/29/18 1908 04/29/18 2011 04/29/18 2011 --   Oral Monitor Lying Left arm       Pain Score       04/29/18 1943       Worst Possible Pain           Orthostatic Vital Signs  Vitals:    04/29/18 1908 04/29/18 2011   BP: 136/80 119/63   Pulse: 86 68   Patient Position - Orthostatic VS:  Lying       Physical Exam   Constitutional: She is oriented to person, place, and time  She appears well-developed and well-nourished  Non-toxic appearance  She does not have a sickly appearance  She does not appear ill  No distress  HENT:   Head: Normocephalic and atraumatic  Eyes: EOM and lids are normal  Pupils are equal, round, and reactive to light  Neck: Normal range of motion  Neck supple  Cardiovascular: Normal rate, regular rhythm, S1 normal, S2 normal, normal heart sounds, intact distal pulses and normal pulses  Exam reveals no gallop, no distant heart sounds, no friction rub and no decreased pulses  No murmur heard    Pulses: Radial pulses are 2+ on the right side, and 2+ on the left side  Pulmonary/Chest: Effort normal and breath sounds normal  No accessory muscle usage  No apnea, no tachypnea and no bradypnea  No respiratory distress  She has no decreased breath sounds  She has no wheezes  She has no rhonchi  She has no rales  Abdominal: Soft  Normal appearance and bowel sounds are normal  She exhibits no distension and no mass  There is tenderness (Left flank)  There is CVA tenderness  There is no rigidity, no rebound and no guarding  No hernia  Musculoskeletal: Normal range of motion  She exhibits no edema, tenderness or deformity  Neurological: She is alert and oriented to person, place, and time  No cranial nerve deficit  GCS eye subscore is 4  GCS verbal subscore is 5  GCS motor subscore is 6  GCS 15  AAOx3  CN II-XII grossly intact  No focal neuro deficits  Skin: Skin is warm, dry and intact  No rash noted  She is not diaphoretic  No erythema  No pallor  Psychiatric: Her speech is normal    Nursing note and vitals reviewed        ED Medications  Medications   HYDROmorphone (DILAUDID) injection 1 mg (1 mg Intravenous Given 4/29/18 1943)   ondansetron (ZOFRAN) injection 4 mg (4 mg Intravenous Given 4/29/18 2048)       Diagnostic Studies  Results Reviewed     Procedure Component Value Units Date/Time    Urine Microscopic [[id number]]  (Abnormal) Collected:  04/29/18 2117    Lab Status:  Final result Specimen:  Urine from Urine, Clean Catch Updated:  04/29/18 2146     RBC, UA 30-50 (A) /hpf      WBC, UA 2-4 (A) /hpf      Epithelial Cells Occasional /hpf      Bacteria, UA None Seen /hpf     UA w Reflex to Microscopic w Reflex to Culture [[id number]]  (Abnormal) Collected:  04/29/18 2117    Lab Status:  Final result Specimen:  Urine from Urine, Clean Catch Updated:  04/29/18 2137     Color, UA Yellow     Clarity, UA Cloudy     Specific Gravity, UA 1 025     pH, UA 6 5     Leukocytes, UA Trace (A)     Nitrite, UA Negative Protein,  (2+) (A) mg/dl      Glucose, UA Negative mg/dl      Ketones, UA Negative mg/dl      Urobilinogen, UA 0 2 E U /dl      Bilirubin, UA Negative     Blood, UA Large (A)    POCT pregnancy, urine [[id number]]  (Normal) Resulted:  04/29/18 2054    Lab Status:  Final result Updated:  04/29/18 2054     EXT PREG TEST UR (Ref: Negative) negative    POCT urinalysis dipstick [[id number]]  (Normal) Resulted:  04/29/18 2053    Lab Status:  Final result Specimen:  Urine Updated:  04/29/18 2054     Color, UA yellow     Clarity, UA clear     EXT Glucose, UA (Ref: Negative) negative     EXT Bilirubin, UA (Ref: Negative) small     EXT Ketones, UA (Ref: Negative) negative     EXT Spec Grav, UA 1 025     EXT Blood, UA (Ref: Negative) large     EXT pH, UA 6 5     EXT Protein, UA (Ref: Negative) trace     EXT Urobilinogen, UA (Ref: 0 2- 1 0) negative     EXT Leukocytes, UA (Ref: Negative) negative     EXT Nitrite, UA (Ref: Negative) trace    Comprehensive metabolic panel [[id number]]  (Abnormal) Collected:  04/29/18 1919    Lab Status:  Final result Specimen:  Blood from Arm, Right Updated:  04/29/18 1953     Sodium 137 mmol/L      Potassium 3 8 mmol/L      Chloride 101 mmol/L      CO2 22 mmol/L      Anion Gap 14 (H) mmol/L      BUN 11 mg/dL      Creatinine 0 82 mg/dL      Glucose 108 mg/dL      Calcium 9 6 mg/dL      AST 44 U/L      ALT 34 U/L      Alkaline Phosphatase 105 U/L      Total Protein 8 4 (H) g/dL      Albumin 3 4 (L) g/dL      Total Bilirubin 0 30 mg/dL      eGFR 92 ml/min/1 73sq m     Narrative:         National Kidney Disease Education Program recommendations are as follows:  GFR calculation is accurate only with a steady state creatinine  Chronic Kidney disease less than 60 ml/min/1 73 sq  meters  Kidney failure less than 15 ml/min/1 73 sq  meters      Lipase [[id number]]  (Abnormal) Collected:  04/29/18 1919    Lab Status:  Final result Specimen:  Blood from Arm, Right Updated:  04/29/18 1953     Lipase 69 (L) u/L CBC and differential [[id number]]  (Abnormal) Collected:  04/29/18 1919    Lab Status:  Final result Specimen:  Blood from Arm, Right Updated:  04/29/18 1927     WBC 12 41 (H) Thousand/uL      RBC 4 65 Million/uL      Hemoglobin 10 6 (L) g/dL      Hematocrit 34 2 (L) %      MCV 74 (L) fL      MCH 22 8 (L) pg      MCHC 31 0 (L) g/dL      RDW 17 5 (H) %      MPV 11 4 fL      Platelets 216 (H) Thousands/uL      nRBC 0 /100 WBCs      Neutrophils Relative 71 %      Lymphocytes Relative 22 %      Monocytes Relative 6 %      Eosinophils Relative 1 %      Basophils Relative 1 %      Neutrophils Absolute 8 80 (H) Thousands/µL      Lymphocytes Absolute 2 67 Thousands/µL      Monocytes Absolute 0 75 Thousand/µL      Eosinophils Absolute 0 09 Thousand/µL      Basophils Absolute 0 06 Thousands/µL                  CT abdomen pelvis wo contrast   Final Result by Felicia Jarrett DO (04/29 2116)      Inflammatory changes around the ureter and mild hydroureter  No evidence of obstructing stone  Findings may be seen in the setting of recently passed stone versus infection  Clinical correlation is recommended  Bilateral nonobstructing nephrolithiasis  Colonic diverticulosis without evidence of acute diverticulitis         Workstation performed: [id number]                    Procedures  Procedures       Phone Contacts  ED Phone Contact    ED Course  ED Course as of Apr 29 2207   Sun Apr 29, 2018 2045 Improved  Hemoglobin: (!) 10 [phone]  Spoke with Dr Rosemary Reddy and anuj  No further intervention at this time  Recommends outpatient follow-up  MDM  Number of Diagnoses or Management Options  Left flank pain: new and requires workup  Diagnosis management comments: DDx including but not limited to: renal colic, pyelonephritis, UTI, GI etiology, appendicitis, diverticulitis, cholecystitis, biliary colic, AAA, rhabdomyolysis, tumor    Plan:  Clinically suspect she is having pain from removal of her ureteral stent  Will check CT for any large retroperitoneal hematoma  Check laboratory evaluation  Urinalysis  Disposition pending  Will give analgesia  Amount and/or Complexity of Data Reviewed  Clinical lab tests: ordered and reviewed  Tests in the radiology section of CPT®: ordered and reviewed  Independent visualization of images, tracings, or specimens: yes    Risk of Complications, Morbidity, and/or Mortality  Presenting problems: moderate  Management options: low  General comments: 70-year-old female with left flank pain  Workup is unremarkable  Pain has completely resolved at this time  She has been resting comfortably  I spoke with Dr Nick Liu from Urology who agrees with outpatient follow-up and no further intervention at this time  We discussed return parameters  Patient understands and agrees with this plan  Patient Progress  Patient progress: stable    CritCare Time    Disposition  Final diagnoses:   Left flank pain - after removal of left ureteral stent     Time reflects when diagnosis was documented in both MDM as applicable and the Disposition within this note     Time User Action Codes Description Comment    4/29/2018 10:02 PM Vinay Amezcua [R10 9] Left flank pain     4/29/2018 10:06 PM Vinay Amezcua [H79 514B] Displacement of ureteral stent ([address] )     4/29/2018 10:06 PM Edmundo Pleitez Displacement of ureteral stent ([address] )     4/29/2018 10:06 PM Shaneka Chi [R10 9] Left flank pain after removal of left ureteral stent      ED Disposition     ED Disposition Condition Comment    Discharge  Eddie Cochran discharge to home/self care      Condition at discharge: Good        Follow-up Information     Follow up With Specialties Details [facility] [address]  For Urology [facility] Call for follow up evaluation [address] [facility]  [address] [facility] [id number] [phone]        Patient's Medications Discharge Prescriptions    No medications on file     No discharge procedures on file      ED Provider  Electronically Signed by           Pascale Funk PA-C  04/29/18 350 Bob Moreno PA-C  04/29/18 9042

## 2018-04-30 NOTE — DISCHARGE INSTRUCTIONS
Return sooner to the Emergency Department if increased pain, swelling, numbness, weakness, fever, redness, vomiting  Flank Pain   WHAT YOU NEED TO KNOW:   Flank pain is felt in the area below your ribcage and above your hip bones, often in the lower back  Your pain may be dull or so severe that you cannot get comfortable  The pain may stay in one area or radiate to another area  It may worsen and lighten in waves  Flank pain is often a sign of problems with your urinary tract, such as a kidney stone or infection  DISCHARGE INSTRUCTIONS:   Return to the emergency department if:   · You have a fever  · Your heart is fluttering or jumping  · You see blood in your urine  · Your pain radiates into your lower abdomen and genital area  · You have intense pain in your low back next to your spine  · You are much more tired than usual and have no desire to eat  · You have a headache and your muscles jerk  Contact your healthcare provider if:   · You have an upset stomach and are vomiting  · You have to urinate more often, and with urgency  · Your pain worsens or does not improve, and you cannot get comfortable  · You pass a stone when you urinate  · You have questions or concerns about your condition or care  Medicines: The following medicines may be ordered for you:  · Pain medicine  may help decrease or relieve your pain  Do not wait until the pain is severe before you take your medicine  · Antibiotics  may help treat a urinary tract infection caused by bacteria  · Take your medicine as directed  Contact your healthcare provider if you think your medicine is not helping or if you have side effects  Tell him of her if you are allergic to any medicine  Keep a list of the medicines, vitamins, and herbs you take  Include the amounts, and when and why you take them  Bring the list or the pill bottles to follow-up visits   Carry your medicine list with you in case of an emergency  Follow up with your healthcare provider in 1 to 2 days or as directed:  Write down your questions so you remember to ask them during your visits  © 2017 [address]  Information is for End User's use only and may not be sold, redistributed or otherwise used for commercial purposes  All illustrations and images included in CareNotes® are the copyrighted property of [employer]  or [employer]  The above information is an  only  It is not intended as medical advice for individual conditions or treatments  Talk to your doctor, nurse or pharmacist before following any medical regimen to see if it is safe and effective for you  Ureteral Stent Placement   WHAT YOU NEED TO KNOW:   Ureteral stent placement is a procedure to open a blocked or narrow ureter  The ureter is the tube that carries urine from your kidney into your bladder  A stent is a thin hollow plastic tube used to hold your ureter open and allow urine to flow  The stent may stay in for several weeks  DISCHARGE INSTRUCTIONS:   Medicines:   · Pain medicine  may be given to take away or decrease pain  Do not wait until the pain is severe before you take your medicine  · Antibiotics  help prevent infections  Your healthcare provider may prescribe these for you while your stent remains in  · Take your medicine as directed  Contact your healthcare provider if you think your medicine is not helping or if you have side effects  Tell him or her if you are allergic to any medicine  Keep a list of the medicines, vitamins, and herbs you take  Include the amounts, and when and why you take them  Bring the list or the pill bottles to follow-up visits  Carry your medicine list with you in case of an emergency  Follow up with your urologist as directed: You will need regular follow-up visits with your urologist as long as the stent remains in  He will check to make sure the stent is working properly  He may do urine cultures to check for infection  Write down your questions so you remember to ask them during your visits  Self-care:   · Drink liquids  as directed  Ask your healthcare provider how much liquid to drink each day and which liquids are best for you  Fluids such as cranberry or apple juice may be especially helpful to prevent urinary infections  · Return to normal activities  the day after your stent placement or as directed by your healthcare provider  · You may take a shower  the day after your stent placement if your healthcare provider says it is okay  Contact your healthcare provider or urologist if:   · You have a fever or chills  · You feel like you need to urinate often  · You have pain when you urinate or pain around your bladder or kidney  · You see blood in your urine or it looks cloudy  · You have questions or concerns about your condition or care  Seek care immediately or call 911 if:   · You urinate little or not at all  · You have severe pain in your abdomen  © 2017 [address] Information is for End User's use only and may not be sold, redistributed or otherwise used for commercial purposes  All illustrations and images included in CareNotes® are the copyrighted property of [employer]  or [employer]  The above information is an  only  It is not intended as medical advice for individual conditions or treatments  Talk to your doctor, nurse or pharmacist before following any medical regimen to see if it is safe and effective for you

## 2018-05-01 NOTE — TELEPHONE ENCOUNTER
I called patient and she stated that her  removed her stent and then she had pain and went to ER  Patient stated they gave her pain meds and she has been doing well since  I explained to patient that I instructed her prior that could be a possibility and to drink plenty of water and take the medication that was prescribed

## 2018-07-02 NOTE — PROGRESS NOTES
This is not my patient  I have never seen this patient  They have no scheduled appointment at this office  No doctor-patient relationship exists

## 2019-06-28 ENCOUNTER — HOSPITAL ENCOUNTER (EMERGENCY)
Facility: HOSPITAL | Age: 39
Discharge: LEFT BEFORE DISCHARGE | End: 2019-06-28
Attending: EMERGENCY MEDICINE

## 2019-06-28 VITALS
DIASTOLIC BLOOD PRESSURE: 94 MMHG | WEIGHT: 205 LBS | BODY MASS INDEX: 35.19 KG/M2 | SYSTOLIC BLOOD PRESSURE: 178 MMHG | TEMPERATURE: 98.5 F | HEART RATE: 91 BPM | OXYGEN SATURATION: 99 % | RESPIRATION RATE: 18 BRPM

## 2019-06-28 DIAGNOSIS — R51.9 ACUTE HEADACHE: Primary | ICD-10-CM

## 2019-06-28 LAB
ALBUMIN SERPL BCP-MCNC: 3.1 G/DL (ref 3.5–5)
ALP SERPL-CCNC: 96 U/L (ref 46–116)
ALT SERPL W P-5'-P-CCNC: 22 U/L (ref 12–78)
ANION GAP SERPL CALCULATED.3IONS-SCNC: 11 MMOL/L (ref 4–13)
AST SERPL W P-5'-P-CCNC: 25 U/L (ref 5–45)
BASOPHILS # BLD AUTO: 0.06 THOUSANDS/ΜL (ref 0–0.1)
BASOPHILS NFR BLD AUTO: 1 % (ref 0–1)
BILIRUB SERPL-MCNC: 0.2 MG/DL (ref 0.2–1)
BUN SERPL-MCNC: 11 MG/DL (ref 5–25)
CALCIUM SERPL-MCNC: 8.5 MG/DL (ref 8.3–10.1)
CHLORIDE SERPL-SCNC: 103 MMOL/L (ref 100–108)
CO2 SERPL-SCNC: 25 MMOL/L (ref 21–32)
CREAT SERPL-MCNC: 0.81 MG/DL (ref 0.6–1.3)
EOSINOPHIL # BLD AUTO: 0.15 THOUSAND/ΜL (ref 0–0.61)
EOSINOPHIL NFR BLD AUTO: 1 % (ref 0–6)
ERYTHROCYTE [DISTWIDTH] IN BLOOD BY AUTOMATED COUNT: 16.9 % (ref 11.6–15.1)
GFR SERPL CREATININE-BSD FRML MDRD: 93 ML/MIN/1.73SQ M
GLUCOSE SERPL-MCNC: 80 MG/DL (ref 65–140)
HCT VFR BLD AUTO: 32.1 % (ref 34.8–46.1)
HGB BLD-MCNC: 9.9 G/DL (ref 11.5–15.4)
IMM GRANULOCYTES # BLD AUTO: 0.05 THOUSAND/UL (ref 0–0.2)
IMM GRANULOCYTES NFR BLD AUTO: 0 % (ref 0–2)
LYMPHOCYTES # BLD AUTO: 2.6 THOUSANDS/ΜL (ref 0.6–4.47)
LYMPHOCYTES NFR BLD AUTO: 23 % (ref 14–44)
MCH RBC QN AUTO: 23.3 PG (ref 26.8–34.3)
MCHC RBC AUTO-ENTMCNC: 30.8 G/DL (ref 31.4–37.4)
MCV RBC AUTO: 76 FL (ref 82–98)
MONOCYTES # BLD AUTO: 0.73 THOUSAND/ΜL (ref 0.17–1.22)
MONOCYTES NFR BLD AUTO: 6 % (ref 4–12)
NEUTROPHILS # BLD AUTO: 7.84 THOUSANDS/ΜL (ref 1.85–7.62)
NEUTS SEG NFR BLD AUTO: 69 % (ref 43–75)
NRBC BLD AUTO-RTO: 0 /100 WBCS
PLATELET # BLD AUTO: 476 THOUSANDS/UL (ref 149–390)
PMV BLD AUTO: 10.9 FL (ref 8.9–12.7)
POTASSIUM SERPL-SCNC: 3.7 MMOL/L (ref 3.5–5.3)
PROT SERPL-MCNC: 7.5 G/DL (ref 6.4–8.2)
RBC # BLD AUTO: 4.24 MILLION/UL (ref 3.81–5.12)
SODIUM SERPL-SCNC: 139 MMOL/L (ref 136–145)
WBC # BLD AUTO: 11.43 THOUSAND/UL (ref 4.31–10.16)

## 2019-06-28 PROCEDURE — 85025 COMPLETE CBC W/AUTO DIFF WBC: CPT | Performed by: PHYSICIAN ASSISTANT

## 2019-06-28 PROCEDURE — 99284 EMERGENCY DEPT VISIT MOD MDM: CPT

## 2019-06-28 PROCEDURE — 36415 COLL VENOUS BLD VENIPUNCTURE: CPT | Performed by: PHYSICIAN ASSISTANT

## 2019-06-28 PROCEDURE — 80053 COMPREHEN METABOLIC PANEL: CPT | Performed by: PHYSICIAN ASSISTANT

## 2019-06-28 PROCEDURE — 99284 EMERGENCY DEPT VISIT MOD MDM: CPT | Performed by: PHYSICIAN ASSISTANT

## 2019-06-28 RX ORDER — KETOROLAC TROMETHAMINE 30 MG/ML
15 INJECTION, SOLUTION INTRAMUSCULAR; INTRAVENOUS ONCE
Status: DISCONTINUED | OUTPATIENT
Start: 2019-06-28 | End: 2019-06-29 | Stop reason: HOSPADM

## 2019-06-28 RX ORDER — METOCLOPRAMIDE HYDROCHLORIDE 5 MG/ML
10 INJECTION INTRAMUSCULAR; INTRAVENOUS ONCE
Status: DISCONTINUED | OUTPATIENT
Start: 2019-06-28 | End: 2019-06-29 | Stop reason: HOSPADM

## 2019-06-28 RX ORDER — DIPHENHYDRAMINE HYDROCHLORIDE 50 MG/ML
50 INJECTION INTRAMUSCULAR; INTRAVENOUS ONCE
Status: DISCONTINUED | OUTPATIENT
Start: 2019-06-28 | End: 2019-06-29 | Stop reason: HOSPADM

## 2019-06-29 NOTE — ED NOTES
Multiple Rn's attempted to start peripheral IV without success, patient refused use of guided ultrasound for IV and medications, MD made aware      Ashley Mathur, KARIN  06/28/19 2975

## 2019-06-29 NOTE — DISCHARGE INSTRUCTIONS
Return to the Emergency Department sooner if increased pain, fever, vomiting, lethargy, blurry vision, dizziness, weakness, difficulty walking or breathing, rash

## 2019-06-29 NOTE — ED PROVIDER NOTES
History  Chief Complaint   Patient presents with    Headache - Recurrent or Known Dx Migraines     started this morning, took excedrin that helped  now came back worse  hx of migraines     42 yo female here with headache  Started earlier today  Frontal and right sided  Throbbing pain  Nausea, no vomiting  Photophobia and phonophobia  Blurred vision  Has h/o migraines and current symptoms are typical of her migraines  States only difference is that typically when she takes excedrin her headaches improve but this is not working today  Denies fever, chills, chest pain or sob  No facial numbness, tingling weakness  No extremity weakness  No neck pain or stiffness  No trauma or injury  History provided by:  Patient   used: No    Headache - New Onset or New Symptoms   Pain location:  R temporal  Radiates to:  Does not radiate  Severity currently:  8/10  Severity at highest:  10/10  Onset quality:  Gradual  Duration:  12 hours  Timing:  Constant  Progression:  Waxing and waning  Chronicity:  New  Similar to prior headaches: yes    Context: not activity, not exposure to bright light, not caffeine, not coughing, not defecating, not eating, not stress, not exposure to cold air, not intercourse, not loud noise and not straining    Relieved by:  Nothing  Worsened by:  Nothing  Ineffective treatments:  None tried  Associated symptoms: blurred vision, nausea, photophobia and visual change (blurred)    Associated symptoms: no abdominal pain, no back pain, no congestion, no cough, no diarrhea, no dizziness, no drainage, no ear pain, no fatigue, no fever, no myalgias, no neck pain, no neck stiffness, no numbness, no sinus pressure, no sore throat, no vomiting and no weakness        Prior to Admission Medications   Prescriptions Last Dose Informant Patient Reported?  Taking?   acetaminophen (TYLENOL) 325 mg tablet   No No   Sig: Take 2 tablets (650 mg total) by mouth every 6 (six) hours as needed for mild pain   oxybutynin (DITROPAN) 5 mg tablet   No No   Sig: Take 1 tablet (5 mg total) by mouth 3 (three) times a day as needed (stent discomfort)   tamsulosin (FLOMAX) 0 4 mg   No No   Sig: Take 1 capsule (0 4 mg total) by mouth daily with dinner      Facility-Administered Medications: None       Past Medical History:   Diagnosis Date    Renal disorder        Past Surgical History:   Procedure Laterality Date     SECTION      CHOLECYSTECTOMY      MN CYSTO/URETERO W/LITHOTRIPSY &INDWELL STENT INSRT Left 2018    Procedure: CYSTOSCOPY URETEROSCOPY WITH LITHOTRIPSY HOLMIUM LASER, RETROGRADE PYELOGRAM AND INSERTION STENT URETERAL;  Surgeon: Sabine Monique MD;  Location: [facility] OR;  Service: Urology    TUBAL LIGATION         History reviewed  No pertinent family history  I have reviewed and agree with the history as documented  Social History     Tobacco Use    Smoking status: Never Smoker    Smokeless tobacco: Never Used   Substance Use Topics    Alcohol use: No    Drug use: No        Review of Systems   Constitutional: Negative for activity change, appetite change, chills, diaphoresis, fatigue, fever and unexpected weight change  HENT: Negative for congestion, ear pain, postnasal drip, rhinorrhea, sinus pressure, sore throat and trouble swallowing  Eyes: Positive for blurred vision and photophobia  Negative for visual disturbance  Respiratory: Negative for apnea, cough, choking, chest tightness, shortness of breath, wheezing and stridor  Cardiovascular: Negative for chest pain, palpitations and leg swelling  Gastrointestinal: Positive for nausea  Negative for abdominal distention, abdominal pain, blood in stool, constipation, diarrhea and vomiting  Genitourinary: Negative for decreased urine volume, difficulty urinating, dysuria, enuresis, flank pain, frequency, hematuria and urgency  Musculoskeletal: Negative for arthralgias, back pain, myalgias, neck pain and neck stiffness  Skin: Negative for color change, pallor, rash and wound  Allergic/Immunologic: Negative  Neurological: Negative for dizziness, tremors, syncope, weakness, light-headedness, numbness and headaches  Hematological: Negative  Psychiatric/Behavioral: Negative  All other systems reviewed and are negative  Physical Exam  Physical Exam   Constitutional: She is oriented to person, place, and time  She appears well-developed and well-nourished  Non-toxic appearance  She does not have a sickly appearance  She does not appear ill  No distress  HENT:   Head: Normocephalic and atraumatic  Eyes: Pupils are equal, round, and reactive to light  EOM and lids are normal    Neck: Normal range of motion  Neck supple  Cardiovascular: Normal rate, regular rhythm, S1 normal, S2 normal, normal heart sounds, intact distal pulses and normal pulses  Exam reveals no gallop, no distant heart sounds, no friction rub and no decreased pulses  No murmur heard  Pulses:       Radial pulses are 2+ on the right side, and 2+ on the left side  Pulmonary/Chest: Effort normal and breath sounds normal  No accessory muscle usage  No apnea, no tachypnea and no bradypnea  No respiratory distress  She has no decreased breath sounds  She has no wheezes  She has no rhonchi  She has no rales  Abdominal: Soft  Normal appearance  She exhibits no distension  There is no tenderness  There is no rigidity, no rebound and no guarding  Musculoskeletal: Normal range of motion  She exhibits no edema, tenderness or deformity  Neurological: She is alert and oriented to person, place, and time  No cranial nerve deficit  GCS eye subscore is 4  GCS verbal subscore is 5  GCS motor subscore is 6  GCS 15  AAOx3  No focal neuro deficits  CN II-XII grossly intact  Speech normal, no aphasia or dysarthria  No pronator drift  Cerebellar function normal  Finger to nose normal  PERRL  EOMI  Peripheral vision intact  No nystagmus   Upper and lower extremity strength 5/5 through   strength 5/5 b/l  Gross sensation intact b/l  Skin: Skin is warm, dry and intact  No rash noted  She is not diaphoretic  No erythema  No pallor  Psychiatric: Her speech is normal    Nursing note and vitals reviewed        Vital Signs  ED Triage Vitals   Temperature Pulse Respirations Blood Pressure SpO2   06/28/19 1942 06/28/19 1941 06/28/19 1941 06/28/19 1942 06/28/19 1941   98 5 °F (36 9 °C) 91 18 (!) 178/94 99 %      Temp Source Heart Rate Source Patient Position - Orthostatic VS BP Location FiO2 (%)   06/28/19 1942 -- -- -- --   Oral          Pain Score       06/28/19 1942       6           Vitals:    06/28/19 1941 06/28/19 1942   BP:  (!) 178/94   Pulse: 91          Visual Acuity      ED Medications  Medications - No data to display    Diagnostic Studies  Results Reviewed     Procedure Component Value Units Date/Time    Comprehensive metabolic panel [[id number]]  (Abnormal) Collected:  06/28/19 2150    Lab Status:  Final result Specimen:  Blood from Arm, Left Updated:  06/28/19 2211     Sodium 139 mmol/L      Potassium 3 7 mmol/L      Chloride 103 mmol/L      CO2 25 mmol/L      ANION GAP 11 mmol/L      BUN 11 mg/dL      Creatinine 0 81 mg/dL      Glucose 80 mg/dL      Calcium 8 5 mg/dL      AST 25 U/L      ALT 22 U/L      Alkaline Phosphatase 96 U/L      Total Protein 7 5 g/dL      Albumin 3 1 g/dL      Total Bilirubin 0 20 mg/dL      eGFR 93 ml/min/1 73sq m     Narrative:       [place] guidelines for Chronic Kidney Disease (CKD):     Stage 1 with normal or high GFR (GFR > 90 mL/min/1 73 square meters)    Stage 2 Mild CKD (GFR = 60-89 mL/min/1 73 square meters)    Stage 3A Moderate CKD (GFR = 45-59 mL/min/1 73 square meters)    Stage 3B Moderate CKD (GFR = 30-44 mL/min/1 73 square meters)    Stage 4 Severe CKD (GFR = 15-29 mL/min/1 73 square meters)    Stage 5 End Stage CKD (GFR <15 mL/min/1 73 square meters)  Note: GFR calculation is accurate only with a steady state creatinine    CBC and differential [[id number]]  (Abnormal) Collected:  06/28/19 2150    Lab Status:  Final result Specimen:  Blood from Arm, Left Updated:  06/28/19 2156     WBC 11 43 Thousand/uL      RBC 4 24 Million/uL      Hemoglobin 9 9 g/dL      Hematocrit 32 1 %      MCV 76 fL      MCH 23 3 pg      MCHC 30 8 g/dL      RDW 16 9 %      MPV 10 9 fL      Platelets 182 Thousands/uL      nRBC 0 /100 WBCs      Neutrophils Relative 69 %      Immat GRANS % 0 %      Lymphocytes Relative 23 %      Monocytes Relative 6 %      Eosinophils Relative 1 %      Basophils Relative 1 %      Neutrophils Absolute 7 84 Thousands/µL      Immature Grans Absolute 0 05 Thousand/uL      Lymphocytes Absolute 2 60 Thousands/µL      Monocytes Absolute 0 73 Thousand/µL      Eosinophils Absolute 0 15 Thousand/µL      Basophils Absolute 0 06 Thousands/µL                  No orders to display              Procedures  Procedures       ED Course                               MDM  Number of Diagnoses or Management Options  Acute headache: new and requires workup  Diagnosis management comments: DDx including but not limited to: tension headache, cluster headache, migraine, ICH, SAH, tumor, meningitis, temporal arteritis, carbon monoxide poisoning, zoster, sinusitis  Plan: normal neuro exam  Symptoms similar to prior  Will treat with migraine cocktail  Cbc/cmp  dispo pending  Amount and/or Complexity of Data Reviewed  Clinical lab tests: ordered    Risk of Complications, Morbidity, and/or Mortality  Presenting problems: moderate  Management options: low  General comments: 42 yo with headache  Pt chose to leave against medical advice  Cites reason as difficulty getting IV being "ridiculous"  She understands risks of leaving  She is competent and has capacity to make her own decisions  She understands risks of leaving both in layman's term and medical terms   She understands that she can return to ER at any point for re-evaluation  Return parameters provided  Pt understands and agrees with plan  Patient Progress  Patient progress: stable      Disposition  Final diagnoses:   Acute headache     Time reflects when diagnosis was documented in both MDM as applicable and the Disposition within this note     Time User Action Codes Description Comment    6/28/2019 10:20 PM Payne Cheyenne Add [R51] Acute headache       ED Disposition     ED Disposition Condition Date/Time Comment    Dale Perez  Fri Jun 28, 2019 10:20 PM Date: 6/28/2019  Patient: Beth Colalzo  Admitted: 6/28/2019  8:06 PM  Attending Provider: Man Smith*    Beth Collazo or her authorized caregiver has made the decision for the patient to leave the emergency department against the ad vice of [facility]  She or her authorized caregiver has been informed and understands the inherent risks, including death, infection, pain  She or her authorized caregiver has decided to accept the responsibility for this decision  Isabel Issac brandon and all necessary parties have been advised that she may return for further evaluation or treatment  Her condition at time of discharge was stable    Beth Collazo had current vital signs as follows:  BP (!) 178/94   Pulse 91   Temp 98 5 °F  (36 9 °C) (Oral)   Resp 18   Wt 93 kg (205 lb)         Follow-up Information     Follow up With Specialties Details Why Contact Info Additional [address] [facility] Emergency Department Emergency Medicine Go to  If symptoms worsen [address] [facility] [address], [place], [address]          Discharge Medication List as of 6/28/2019 10:20 PM      CONTINUE these medications which have NOT CHANGED    Details   acetaminophen (TYLENOL) 325 mg tablet Take 2 tablets (650 mg total) by mouth every 6 (six) hours as needed for mild pain, Starting Thu 4/26/2018, No Print      oxybutynin (DITROPAN) 5 mg tablet Take 1 tablet (5 mg total) by mouth 3 (three) times a day as needed (stent discomfort), Starting Thu 4/26/2018, Print      tamsulosin (FLOMAX) 0 4 mg Take 1 capsule (0 4 mg total) by mouth daily with dinner, Starting Thu 4/26/2018, Print           No discharge procedures on file      ED Provider  Electronically Signed by           Alison Neri PA-C  06/29/19 5762

## 2019-08-27 NOTE — DISCHARGE INSTRUCTIONS
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 pt states has had 3 doses since, pt states saw pcp today and was told to come to ED, pt states having nausea, pt states taking zofran without relief, pt states last bm a few hours ago, pt denies any urinary complaints Cystoscopy   WHAT YOU NEED TO KNOW:   A cystoscopy is a procedure to look inside of your urethra and bladder using a cystoscope  A cystoscope is a small tube with a light and magnifying camera on the end  The procedure is used to diagnose and treat conditions of the bladder, urethra, and prostate  The procedure is also done to remove stones or blood clots from the urethra or bladder  Your healthcare provider may do other tests, such as ureteroscopy, during a cystoscopy  DISCHARGE INSTRUCTIONS:   Call 911 if:   · You suddenly have chest pain or trouble breathing  Seek care immediately if:   · Your urine turns from pink to red, or you have clots in your urine  · You cannot urinate and your bladder feels full  · Your pain or burning becomes worse or lasts longer than 2 days  Contact your healthcare provider or urologist if:   · Your urine stays pink for longer than 3 days  · You urinate less than normal, or still feel like you have to urinate after you use the bathroom  · Your skin is itchy, swollen, or has a new rash  · You have a fever and chills  · You have questions or concerns about your condition or care  Medicines: You may  be given any of the following:  · Antibiotics  help treat or prevent a bacterial infection  · Acetaminophen  decreases pain and fever  It is available without a doctor's order  Ask how much to take and how often to take it  Follow directions  Read the labels of all other medicines you are using to see if they also contain acetaminophen, or ask your doctor or pharmacist  Acetaminophen can cause liver damage if not taken correctly  Do not use more than 4 grams (4,000 milligrams) total of acetaminophen in one day  · Take your medicine as directed  Contact your healthcare provider if you think your medicine is not helping or if you have side effects  Tell him or her if you are allergic to any medicine   Keep a list of the medicines, vitamins, and herbs you take  Include the amounts, and when and why you take them  Bring the list or the pill bottles to follow-up visits  Carry your medicine list with you in case of an emergency  Follow up with your healthcare provider as directed: You may need to have another cystoscopy  Write down your questions so you remember to ask them during your visits  Self-care:   · Drink at least 3 to 4 glasses of water daily for 2 days after your procedure  Do not drink acidic juices such as orange juice and lemonade  Drink water to help prevent blood clots from forming  It can also help decrease the amount of acid in your urine  Acid in your urine may increase the burning feeling when you urinate  · Sit in a warm tub of water  Warm water may relieve pain and bladder spasms  · Do not have sex  until your healthcare provider tells you it is okay  Sex may increase your risk for a urinary tract infection  © 2017 [employer] Information is for End User's use only and may not be sold, redistributed or otherwise used for commercial purposes  All illustrations and images included in CareNotes® are the copyrighted property of [employer]  or [employer]  The above information is an  only  It is not intended as medical advice for individual conditions or treatments  Talk to your doctor, nurse or pharmacist before following any medical regimen to see if it is safe and effective for you  Lithotripsy   WHAT YOU NEED TO KNOW:   What do I need to know about lithotripsy? Lithotripsy is a procedure that uses sound waves to break up stones in the kidney, ureter, or bladder  The stone pieces then pass out of your body through your urine  How do I prepare for a lithotripsy? Your healthcare provider will talk to you about how to prepare for the procedure  He may tell you not to eat or drink anything after midnight on the day of your surgery   He will tell you what medicines to take or not take on the day of your procedure  You may need to stop taking any medicines that thin your blood 1 week or more before your procedure  These medicines include aspirin, ibuprofen, and anticoagulants  What will happen during lithotripsy? You may be given medicine to keep you relaxed and free from pain during the procedure  You may instead be given general anesthesia to keep you asleep and free from pain during the procedure  X-rays or an ultrasound are used to find the kidney stone  You may lie on a cushion filled with water or sit in a bath of warm water  High-energy sound waves are aimed at your kidney stone  The sound waves break the stone into tiny pieces  You will pass these pieces after a few days when you urinate  A stent (tube) may be put into your kidney or ureter through your bladder or back  The stent helps the pieces of stone pass out of your body  What will happen after a lithotripsy? You may have blood in your urine for 1 to 2 days  You may also have bruising and discomfort in your back or abdomen  You may have pain whenever you pass pieces of your kidney stone  This may happen over a few weeks  What are the risks of a lithotripsy? You may develop bleeding around your kidney or get a kidney infection  The pieces of stone may block the flow of urine from your kidney  You may need another lithotripsy, or other procedure, if pieces of stone are left in your body  You may develop a stomach or intestine ulcer  Your kidney may not work correctly after the procedure  Your kidney may stop working completely  This can be life-threatening  CARE AGREEMENT:   You have the right to help plan your care  Learn about your health condition and how it may be treated  Discuss treatment options with your caregivers to decide what care you want to receive  You always have the right to refuse treatment  The above information is an  only   It is not intended as medical advice for individual conditions or treatments  Talk to your doctor, nurse or pharmacist before following any medical regimen to see if it is safe and effective for you  © 2017 [employer] Information is for End User's use only and may not be sold, redistributed or otherwise used for commercial purposes  All illustrations and images included in CareNotes® are the copyrighted property of [employer] M [employer]  or [employer]  Ureteroscopy   WHAT YOU NEED TO KNOW:   What is a ureteroscopy? A ureteroscopy is a procedure to examine in the inside of your urinary tract, which includes your urethra, bladder, ureters, and kidneys  A ureteroscope is a small, thin tube with a light and camera on the end  Ureteroscopy can help your healthcare provider diagnose and treat problems in your urinary tract, such as kidney stones  How do I prepare for a ureteroscopy? Arrange for a ride home after your procedure  You will not be allowed to drive yourself home  Your healthcare provider will talk to you about how to prepare for your procedure  He may tell you not to eat or drink anything after midnight on the day of your surgery  He will tell you what medicines to take or not take on the day of your surgery  You may need blood and urine tests before your procedure  You will receive medicine to keep you asleep or to numb an area of your body during your procedure  Tell healthcare providers if you or anyone in your family has had a problem with anesthesia in the past    What will happen during a ureteroscopy? Your healthcare provider will place the ureteroscope into your urethra  He will pass it through your bladder and into your ureters and kidneys  Your healthcare provider may place tools through the scope that will help him remove tissue or stones  The tools may also help him place stents or sheaths to help keep your ureters open  What are the risks of ureteroscopy? You may bleed more than expected or get an infection   One of your ureters may be injured  You may have a blockage in one of your ureters  You may need another procedure or surgery  CARE AGREEMENT:   You have the right to help plan your care  Learn about your health condition and how it may be treated  Discuss treatment options with your caregivers to decide what care you want to receive  You always have the right to refuse treatment  The above information is an  only  It is not intended as medical advice for individual conditions or treatments  Talk to your doctor, nurse or pharmacist before following any medical regimen to see if it is safe and effective for you  © 2017 [address] [facility] Information is for End User's use only and may not be sold, redistributed or otherwise used for commercial purposes  All illustrations and images included in CareNotes® are the copyrighted property of A D [employer] M [employer] Inc  or [employer] Prince

## 2020-01-15 ENCOUNTER — HOSPITAL ENCOUNTER (EMERGENCY)
Facility: HOSPITAL | Age: 40
Discharge: HOME | End: 2020-01-15
Attending: EMERGENCY MEDICINE | Admitting: EMERGENCY MEDICINE

## 2020-01-15 VITALS
HEIGHT: 62 IN | OXYGEN SATURATION: 100 % | BODY MASS INDEX: 38.64 KG/M2 | WEIGHT: 210 LBS | RESPIRATION RATE: 17 BRPM | DIASTOLIC BLOOD PRESSURE: 75 MMHG | SYSTOLIC BLOOD PRESSURE: 145 MMHG | HEART RATE: 109 BPM | TEMPERATURE: 98.2 F

## 2020-01-15 DIAGNOSIS — J10.1 INFLUENZA B: Primary | ICD-10-CM

## 2020-01-15 LAB
FLUAV RNA NPH QL NAA+PROBE: ABNORMAL
FLUBV RNA NPH QL NAA+PROBE: DETECTED
RSV RNA NPH QL NAA+PROBE: ABNORMAL

## 2020-01-15 PROCEDURE — 99283 EMERGENCY DEPT VISIT LOW MDM: CPT | Performed by: EMERGENCY MEDICINE

## 2020-01-15 PROCEDURE — 99283 EMERGENCY DEPT VISIT LOW MDM: CPT

## 2020-01-15 PROCEDURE — 87631 RESP VIRUS 3-5 TARGETS: CPT | Performed by: PHYSICIAN ASSISTANT

## 2020-01-15 RX ORDER — ONDANSETRON 4 MG/1
4 TABLET, ORALLY DISINTEGRATING ORAL EVERY 8 HOURS PRN
Qty: 20 TABLET | Refills: 0 | Status: SHIPPED | OUTPATIENT
Start: 2020-01-15

## 2020-01-15 RX ORDER — ONDANSETRON 4 MG/1
4 TABLET, ORALLY DISINTEGRATING ORAL ONCE
Status: COMPLETED | OUTPATIENT
Start: 2020-01-15 | End: 2020-01-15

## 2020-01-15 RX ADMIN — ONDANSETRON 4 MG: 4 TABLET, ORALLY DISINTEGRATING ORAL at 08:10

## 2020-01-15 NOTE — ED PROVIDER NOTES
History  Chief Complaint   Patient presents with    Fever - 9 weeks to 74 years     pt c/o fever, cough and body aches for the past 2-3 days     46year old female pt comes in for flu like symptoms and was positive for the flu  She is requesting antiemetics and a work note  History provided by:  Patient   used: No    Nausea   The primary symptoms include nausea and vomiting  The illness does not include chills, dysphagia, bloating or back pain  Associated medical issues do not include GERD, liver disease or hemorrhoids  Prior to Admission Medications   Prescriptions Last Dose Informant Patient Reported? Taking?   acetaminophen (TYLENOL) 325 mg tablet   No No   Sig: Take 2 tablets (650 mg total) by mouth every 6 (six) hours as needed for mild pain   oxybutynin (DITROPAN) 5 mg tablet   No No   Sig: Take 1 tablet (5 mg total) by mouth 3 (three) times a day as needed (stent discomfort)   tamsulosin (FLOMAX) 0 4 mg   No No   Sig: Take 1 capsule (0 4 mg total) by mouth daily with dinner      Facility-Administered Medications: None       Past Medical History:   Diagnosis Date    Renal disorder        Past Surgical History:   Procedure Laterality Date     SECTION      CHOLECYSTECTOMY      [place] CYSTO/URETERO W/LITHOTRIPSY &INDWELL STENT INSRT Left 2018    Procedure: CYSTOSCOPY URETEROSCOPY WITH LITHOTRIPSY HOLMIUM LASER, RETROGRADE PYELOGRAM AND INSERTION STENT URETERAL;  Surgeon: Cezar Arango MD;  Location: [facility];  Service: Urology    TUBAL LIGATION         History reviewed  No pertinent family history  I have reviewed and agree with the history as documented  Social History     Tobacco Use    Smoking status: Never Smoker    Smokeless tobacco: Never Used   Substance Use Topics    Alcohol use: No    Drug use: No        Review of Systems   Constitutional: Negative for chills  Gastrointestinal: Positive for nausea and vomiting   Negative for bloating and dysphagia  Musculoskeletal: Negative for back pain  All other systems reviewed and are negative  Physical Exam  Physical Exam   Constitutional: She is oriented to person, place, and time  She appears well-developed and well-nourished  HENT:   Head: Normocephalic and atraumatic  Right Ear: External ear normal    Left Ear: External ear normal    Eyes: Conjunctivae and EOM are normal    Neck: No JVD present  No tracheal deviation present  No thyromegaly present  Cardiovascular: Normal rate  Pulmonary/Chest: Effort normal and breath sounds normal  No stridor  Abdominal: Soft  She exhibits no distension and no mass  There is no tenderness  There is no guarding  No hernia  Musculoskeletal: Normal range of motion  She exhibits no edema, tenderness or deformity  Lymphadenopathy:     She has no cervical adenopathy  Neurological: She is alert and oriented to person, place, and time  Skin: Skin is warm  No rash noted  No erythema  No pallor  Psychiatric: She has a normal mood and affect  Her behavior is normal    Nursing note and vitals reviewed        Vital Signs  ED Triage Vitals [01/15/20 0643]   Temperature Pulse Respirations Blood Pressure SpO2   98 2 °F (36 8 °C) (!) 109 17 145/75 100 %      Temp Source Heart Rate Source Patient Position - Orthostatic VS BP Location FiO2 (%)   Oral Monitor Sitting Right arm --      Pain Score       5           Vitals:    01/15/20 0643   BP: 145/75   Pulse: (!) 109   Patient Position - Orthostatic VS: Sitting         Visual Acuity      ED Medications  Medications   ondansetron (ZOFRAN-ODT) dispersible tablet 4 mg (4 mg Oral Given 1/15/20 0810)       Diagnostic Studies  Results Reviewed     Procedure Component Value Units Date/Time    Influenza A/B and RSV PCR [[id number]]  (Abnormal) Collected:  01/15/20 0651    Lab Status:  Final result Specimen:  Nose Updated:  01/15/20 0732     INFLUENZA A PCR None Detected     INFLUENZA B PCR Detected     RSV PCR None Detected                 No orders to display              Procedures  Procedures         ED Course                               MDM  Number of Diagnoses or Management Options  Influenza B: new and requires workup     Amount and/or Complexity of Data Reviewed  Clinical lab tests: reviewed  Decide to obtain previous medical records or to obtain history from someone other than the patient: yes  Review and summarize past medical records: yes    Patient Progress  Patient progress: stable        Disposition  Final diagnoses:   Influenza B     Time reflects when diagnosis was documented in both MDM as applicable and the Disposition within this note     Time User Action Codes Description Comment    1/15/2020  8:02 AM Marvel Vazquez Add [J10 1] Influenza B       ED Disposition     ED Disposition Condition Date/Time Comment    Discharge Stable Wed Steve 15, 2020  8:02 AM Valerie Alvarez discharge to home/self care  Follow-up Information    None         Patient's Medications   Discharge Prescriptions    ONDANSETRON (ZOFRAN-ODT) 4 MG DISINTEGRATING TABLET    Take 1 tablet (4 mg total) by mouth every 8 (eight) hours as needed for nausea or vomiting       Start Date: 1/15/2020 End Date: --       Order Dose: 4 mg       Quantity: 20 tablet    Refills: 0     No discharge procedures on file      ED Provider  Electronically Signed by           Erlinda Barahona DO  01/15/20 7255

## 2020-01-20 ENCOUNTER — APPOINTMENT (EMERGENCY)
Dept: RADIOLOGY | Facility: HOSPITAL | Age: 40
End: 2020-01-20

## 2020-01-20 ENCOUNTER — HOSPITAL ENCOUNTER (OUTPATIENT)
Facility: HOSPITAL | Age: 40
Setting detail: OBSERVATION
Discharge: HOME | End: 2020-01-22
Attending: EMERGENCY MEDICINE | Admitting: INTERNAL MEDICINE

## 2020-01-20 ENCOUNTER — APPOINTMENT (EMERGENCY)
Dept: CT IMAGING | Facility: HOSPITAL | Age: 40
End: 2020-01-20

## 2020-01-20 DIAGNOSIS — L03.211 FACIAL CELLULITIS: Primary | ICD-10-CM

## 2020-01-20 DIAGNOSIS — L03.211 FACIAL CELLULITIS: ICD-10-CM

## 2020-01-20 DIAGNOSIS — R22.0 RIGHT FACIAL SWELLING: ICD-10-CM

## 2020-01-20 DIAGNOSIS — K11.20 SIALOADENITIS: ICD-10-CM

## 2020-01-20 DIAGNOSIS — J10.1 INFLUENZA B: ICD-10-CM

## 2020-01-20 LAB
ALBUMIN SERPL BCP-MCNC: 2.9 G/DL (ref 3.5–5)
ALP SERPL-CCNC: 116 U/L (ref 46–116)
ALT SERPL W P-5'-P-CCNC: 54 U/L (ref 12–78)
ANION GAP SERPL CALCULATED.3IONS-SCNC: 6 MMOL/L (ref 4–13)
AST SERPL W P-5'-P-CCNC: 39 U/L (ref 5–45)
BASOPHILS # BLD AUTO: 0.01 THOUSANDS/ΜL (ref 0–0.1)
BASOPHILS NFR BLD AUTO: 0 % (ref 0–1)
BILIRUB DIRECT SERPL-MCNC: 0.08 MG/DL (ref 0–0.2)
BILIRUB SERPL-MCNC: 0.3 MG/DL (ref 0.2–1)
BUN SERPL-MCNC: 5 MG/DL (ref 5–25)
CALCIUM SERPL-MCNC: 8.5 MG/DL (ref 8.3–10.1)
CHLORIDE SERPL-SCNC: 105 MMOL/L (ref 100–108)
CO2 SERPL-SCNC: 30 MMOL/L (ref 21–32)
CREAT SERPL-MCNC: 0.71 MG/DL (ref 0.6–1.3)
EOSINOPHIL # BLD AUTO: 0.03 THOUSAND/ΜL (ref 0–0.61)
EOSINOPHIL NFR BLD AUTO: 0 % (ref 0–6)
ERYTHROCYTE [DISTWIDTH] IN BLOOD BY AUTOMATED COUNT: 15.9 % (ref 11.6–15.1)
GFR SERPL CREATININE-BSD FRML MDRD: 108 ML/MIN/1.73SQ M
GLUCOSE SERPL-MCNC: 104 MG/DL (ref 65–140)
HCT VFR BLD AUTO: 34.8 % (ref 34.8–46.1)
HGB BLD-MCNC: 10.5 G/DL (ref 11.5–15.4)
IMM GRANULOCYTES # BLD AUTO: 0.04 THOUSAND/UL (ref 0–0.2)
IMM GRANULOCYTES NFR BLD AUTO: 0 % (ref 0–2)
LYMPHOCYTES # BLD AUTO: 1.45 THOUSANDS/ΜL (ref 0.6–4.47)
LYMPHOCYTES NFR BLD AUTO: 13 % (ref 14–44)
MCH RBC QN AUTO: 23.3 PG (ref 26.8–34.3)
MCHC RBC AUTO-ENTMCNC: 30.2 G/DL (ref 31.4–37.4)
MCV RBC AUTO: 77 FL (ref 82–98)
MONOCYTES # BLD AUTO: 0.89 THOUSAND/ΜL (ref 0.17–1.22)
MONOCYTES NFR BLD AUTO: 8 % (ref 4–12)
NEUTROPHILS # BLD AUTO: 9.07 THOUSANDS/ΜL (ref 1.85–7.62)
NEUTS SEG NFR BLD AUTO: 79 % (ref 43–75)
NRBC BLD AUTO-RTO: 0 /100 WBCS
PLATELET # BLD AUTO: 414 THOUSANDS/UL (ref 149–390)
PMV BLD AUTO: 10.7 FL (ref 8.9–12.7)
POTASSIUM SERPL-SCNC: 3 MMOL/L (ref 3.5–5.3)
PROT SERPL-MCNC: 7.7 G/DL (ref 6.4–8.2)
RBC # BLD AUTO: 4.5 MILLION/UL (ref 3.81–5.12)
SODIUM SERPL-SCNC: 141 MMOL/L (ref 136–145)
WBC # BLD AUTO: 11.49 THOUSAND/UL (ref 4.31–10.16)

## 2020-01-20 PROCEDURE — 85025 COMPLETE CBC W/AUTO DIFF WBC: CPT | Performed by: EMERGENCY MEDICINE

## 2020-01-20 PROCEDURE — 96365 THER/PROPH/DIAG IV INF INIT: CPT

## 2020-01-20 PROCEDURE — 96375 TX/PRO/DX INJ NEW DRUG ADDON: CPT

## 2020-01-20 PROCEDURE — 94640 AIRWAY INHALATION TREATMENT: CPT

## 2020-01-20 PROCEDURE — 80076 HEPATIC FUNCTION PANEL: CPT | Performed by: EMERGENCY MEDICINE

## 2020-01-20 PROCEDURE — 99285 EMERGENCY DEPT VISIT HI MDM: CPT

## 2020-01-20 PROCEDURE — 70491 CT SOFT TISSUE NECK W/DYE: CPT

## 2020-01-20 PROCEDURE — 99284 EMERGENCY DEPT VISIT MOD MDM: CPT | Performed by: EMERGENCY MEDICINE

## 2020-01-20 PROCEDURE — 36415 COLL VENOUS BLD VENIPUNCTURE: CPT | Performed by: EMERGENCY MEDICINE

## 2020-01-20 PROCEDURE — 80048 BASIC METABOLIC PNL TOTAL CA: CPT | Performed by: EMERGENCY MEDICINE

## 2020-01-20 PROCEDURE — 99220 PR INITIAL OBSERVATION CARE/DAY 70 MINUTES: CPT | Performed by: INTERNAL MEDICINE

## 2020-01-20 PROCEDURE — 71046 X-RAY EXAM CHEST 2 VIEWS: CPT

## 2020-01-20 PROCEDURE — 96366 THER/PROPH/DIAG IV INF ADDON: CPT

## 2020-01-20 RX ORDER — KETOROLAC TROMETHAMINE 30 MG/ML
15 INJECTION, SOLUTION INTRAMUSCULAR; INTRAVENOUS ONCE
Status: COMPLETED | OUTPATIENT
Start: 2020-01-20 | End: 2020-01-20

## 2020-01-20 RX ORDER — POTASSIUM CHLORIDE 20 MEQ/1
40 TABLET, EXTENDED RELEASE ORAL ONCE
Status: COMPLETED | OUTPATIENT
Start: 2020-01-20 | End: 2020-01-20

## 2020-01-20 RX ORDER — ACETAMINOPHEN 325 MG/1
650 TABLET ORAL EVERY 6 HOURS PRN
Status: DISCONTINUED | OUTPATIENT
Start: 2020-01-20 | End: 2020-01-21

## 2020-01-20 RX ORDER — ONDANSETRON 2 MG/ML
4 INJECTION INTRAMUSCULAR; INTRAVENOUS EVERY 6 HOURS PRN
Status: DISCONTINUED | OUTPATIENT
Start: 2020-01-20 | End: 2020-01-22 | Stop reason: HOSPADM

## 2020-01-20 RX ORDER — ALBUTEROL SULFATE 2.5 MG/3ML
2.5 SOLUTION RESPIRATORY (INHALATION) ONCE
Status: COMPLETED | OUTPATIENT
Start: 2020-01-20 | End: 2020-01-20

## 2020-01-20 RX ORDER — CEFTRIAXONE 2 G/50ML
2000 INJECTION, SOLUTION INTRAVENOUS EVERY 24 HOURS
Status: DISCONTINUED | OUTPATIENT
Start: 2020-01-20 | End: 2020-01-20

## 2020-01-20 RX ORDER — POTASSIUM CHLORIDE 29.8 MG/ML
40 INJECTION INTRAVENOUS ONCE
Status: DISCONTINUED | OUTPATIENT
Start: 2020-01-20 | End: 2020-01-20 | Stop reason: SDUPTHER

## 2020-01-20 RX ORDER — IBUPROFEN 400 MG/1
400 TABLET ORAL EVERY 6 HOURS PRN
Status: DISCONTINUED | OUTPATIENT
Start: 2020-01-20 | End: 2020-01-21

## 2020-01-20 RX ORDER — ALBUTEROL SULFATE 2.5 MG/3ML
2.5 SOLUTION RESPIRATORY (INHALATION) EVERY 6 HOURS PRN
Status: DISCONTINUED | OUTPATIENT
Start: 2020-01-20 | End: 2020-01-22 | Stop reason: HOSPADM

## 2020-01-20 RX ORDER — METHYLPREDNISOLONE SODIUM SUCCINATE 125 MG/2ML
125 INJECTION, POWDER, LYOPHILIZED, FOR SOLUTION INTRAMUSCULAR; INTRAVENOUS ONCE
Status: COMPLETED | OUTPATIENT
Start: 2020-01-20 | End: 2020-01-20

## 2020-01-20 RX ORDER — POTASSIUM CHLORIDE 14.9 MG/ML
20 INJECTION INTRAVENOUS
Status: DISCONTINUED | OUTPATIENT
Start: 2020-01-20 | End: 2020-01-20

## 2020-01-20 RX ORDER — SODIUM CHLORIDE 9 MG/ML
125 INJECTION, SOLUTION INTRAVENOUS CONTINUOUS
Status: DISCONTINUED | OUTPATIENT
Start: 2020-01-20 | End: 2020-01-21

## 2020-01-20 RX ADMIN — IOHEXOL 85 ML: 350 INJECTION, SOLUTION INTRAVENOUS at 14:45

## 2020-01-20 RX ADMIN — ALBUTEROL SULFATE 2.5 MG: 2.5 SOLUTION RESPIRATORY (INHALATION) at 14:56

## 2020-01-20 RX ADMIN — IPRATROPIUM BROMIDE 0.5 MG: 0.5 SOLUTION RESPIRATORY (INHALATION) at 14:56

## 2020-01-20 RX ADMIN — POTASSIUM CHLORIDE 20 MEQ: 200 INJECTION, SOLUTION INTRAVENOUS at 17:22

## 2020-01-20 RX ADMIN — METRONIDAZOLE 500 MG: 500 INJECTION, SOLUTION INTRAVENOUS at 18:29

## 2020-01-20 RX ADMIN — SODIUM CHLORIDE 125 ML/HR: 0.9 INJECTION, SOLUTION INTRAVENOUS at 22:18

## 2020-01-20 RX ADMIN — SODIUM CHLORIDE 125 ML/HR: 0.9 INJECTION, SOLUTION INTRAVENOUS at 17:28

## 2020-01-20 RX ADMIN — IBUPROFEN 400 MG: 400 TABLET ORAL at 22:07

## 2020-01-20 RX ADMIN — POTASSIUM CHLORIDE 40 MEQ: 1500 TABLET, EXTENDED RELEASE ORAL at 18:30

## 2020-01-20 RX ADMIN — CEFTRIAXONE 2000 MG: 2 INJECTION, SOLUTION INTRAVENOUS at 14:15

## 2020-01-20 RX ADMIN — KETOROLAC TROMETHAMINE 15 MG: 30 INJECTION, SOLUTION INTRAMUSCULAR at 14:11

## 2020-01-20 RX ADMIN — METHYLPREDNISOLONE SODIUM SUCCINATE 125 MG: 125 INJECTION, POWDER, FOR SOLUTION INTRAMUSCULAR; INTRAVENOUS at 14:10

## 2020-01-20 RX ADMIN — SODIUM CHLORIDE 1000 ML: 0.9 INJECTION, SOLUTION INTRAVENOUS at 14:18

## 2020-01-20 NOTE — ED NOTES
1  CC facial cellulitis  2  Is this admission due to an injury? No   3  Orientation status alert and oriented x4  4  Abnormal labs/focused assessment/ vitals WBC 11 49, potassium 3, pt does not have L hand  5  Medication/ drips- flagyl, NSS 125ml/hr  6  Narcotic time/ pain none   7  IV lines/ drains/ etc 20 R AC  8  Isolation status none  9  Skin intact   10  Ambulation status self  11   Phone number [phone]       Miguel Garcia RN  01/20/20 2234

## 2020-01-20 NOTE — ED PROVIDER NOTES
History  Chief Complaint   Patient presents with    Facial Swelling     pt states she woke up with right sided facial swelling this morning, pt diagnosed with the flu here a few days ago     HPI  71-year-old white female with a chief complaint of right facial swelling  Patient states she woke up this morning in the whole right side of her face is swollen  Patient was seen here on the  and diagnosed with influenza B  Patient states she has been taking Zofran for nausea but no other new medications  Patient denies any dental pain or toothache  Prior to Admission Medications   Prescriptions Last Dose Informant Patient Reported? Taking?   acetaminophen (TYLENOL) 325 mg tablet   No No   Sig: Take 2 tablets (650 mg total) by mouth every 6 (six) hours as needed for mild pain   ondansetron (ZOFRAN-ODT) 4 mg disintegrating tablet   No No   Sig: Take 1 tablet (4 mg total) by mouth every 8 (eight) hours as needed for nausea or vomiting   oxybutynin (DITROPAN) 5 mg tablet   No No   Sig: Take 1 tablet (5 mg total) by mouth 3 (three) times a day as needed (stent discomfort)   tamsulosin (FLOMAX) 0 4 mg   No No   Sig: Take 1 capsule (0 4 mg total) by mouth daily with dinner      Facility-Administered Medications: None       Past Medical History:   Diagnosis Date    Renal disorder        Past Surgical History:   Procedure Laterality Date     SECTION      CHOLECYSTECTOMY      [place] CYSTO/URETERO W/LITHOTRIPSY &INDWELL STENT INSRT Left 2018    Procedure: CYSTOSCOPY URETEROSCOPY WITH LITHOTRIPSY HOLMIUM LASER, RETROGRADE PYELOGRAM AND INSERTION STENT URETERAL;  Surgeon: Vitaliy Moreira MD;  [facility];  Service: Urology    TUBAL LIGATION         History reviewed  No pertinent family history  I have reviewed and agree with the history as documented  Social History     Tobacco Use    Smoking status: Never Smoker    Smokeless tobacco: Never Used   Substance Use Topics    Alcohol use:  No  Drug use: No        Review of Systems   Constitutional: Negative for chills and fever  HENT: Positive for facial swelling  Negative for congestion, dental problem and rhinorrhea  Eyes: Negative for discharge and visual disturbance  Respiratory: Negative for shortness of breath and wheezing  Cardiovascular: Negative for chest pain and palpitations  Gastrointestinal: Positive for nausea  Negative for abdominal pain and vomiting  Endocrine: Negative for polydipsia and polyuria  Genitourinary: Negative for dysuria and hematuria  Musculoskeletal: Positive for myalgias  Negative for arthralgias, gait problem and neck stiffness  Skin: Negative for rash and wound  Neurological: Negative for dizziness and headaches  Psychiatric/Behavioral: Negative for confusion and suicidal ideas  Physical Exam  Physical Exam   Constitutional: She is oriented to person, place, and time  She appears well-developed and well-nourished  78-year-old white female sitting on the edge of the stretcher with right facial swelling  Patient is having some difficulty opening her mouth  There is also some erythema to the right side of the face,  anterior neck & jaw   HENT:   Head: Normocephalic and atraumatic  There is erythema of the posterior pharynx  I do not appreciate any abscess formation  Pt  Has a dental filling, lower R, with some central lower dental caries    There is exquisite tenderness to the right angle of the jaw with erythema to the right anterior side of the neck and submandibular region on the right  Eyes: Pupils are equal, round, and reactive to light  EOM are normal    Neck: Normal range of motion  Neck supple  Cardiovascular: Regular rhythm and normal heart sounds  Positive tachycardia   Pulmonary/Chest: She has rales  Patient has coarse rhonchi throughout all lung fields  Abdominal: Soft  Bowel sounds are normal  There is no tenderness  Musculoskeletal: Normal range of motion  Neurological: She is alert and oriented to person, place, and time  No cranial nerve deficit  She exhibits normal muscle tone  Coordination normal    Skin: Skin is warm and dry  There is erythema    + erythema to R side of face/neck   Psychiatric: She has a normal mood and affect  Nursing note and vitals reviewed        Vital Signs  ED Triage Vitals [01/20/20 1209]   Temperature Pulse Respirations Blood Pressure SpO2   98 1 °F (36 7 °C) (!) 118 18 133/58 98 %      Temp Source Heart Rate Source Patient Position - Orthostatic VS BP Location FiO2 (%)   Oral Monitor Sitting Left arm --      Pain Score       6           Vitals:    01/20/20 1209   BP: 133/58   Pulse: (!) 118   Patient Position - Orthostatic VS: Sitting         Visual Acuity      ED Medications  Medications   cefTRIAXone (ROCEPHIN) IVPB (premix) 2,000 mg (2,000 mg Intravenous New Bag 1/20/20 1415)   potassium chloride 40 mEq IVPB (premix) (has no administration in time range)   sodium chloride 0 9 % bolus 1,000 mL (1,000 mL Intravenous New Bag 1/20/20 1418)   ketorolac (TORADOL) injection 15 mg (15 mg Intravenous Given 1/20/20 1411)   methylPREDNISolone sodium succinate (Solu-MEDROL) injection 125 mg (125 mg Intravenous Given 1/20/20 1410)   albuterol inhalation solution 2 5 mg (2 5 mg Nebulization Given 1/20/20 1456)   ipratropium (ATROVENT) 0 02 % inhalation solution 0 5 mg (0 5 mg Nebulization Given 1/20/20 1456)   iohexol (OMNIPAQUE) 350 MG/ML injection (MULTI-DOSE) 85 mL (85 mL Intravenous Given 1/20/20 1445)       Diagnostic Studies  Results Reviewed     Procedure Component Value Units Date/Time    Hepatic function panel [[id number]]  (Abnormal) Collected:  01/20/20 1408    Lab Status:  Final result Specimen:  Blood from Arm, Right Updated:  01/20/20 1429     Total Bilirubin 0 30 mg/dL      Bilirubin, Direct 0 08 mg/dL      Alkaline Phosphatase 116 U/L      AST 39 U/L      ALT 54 U/L      Total Protein 7 7 g/dL      Albumin 2 9 g/dL     Basic metabolic panel [[id number]]  (Abnormal) Collected:  01/20/20 1408    Lab Status:  Final result Specimen:  Blood from Arm, Right Updated:  01/20/20 1429     Sodium 141 mmol/L      Potassium 3 0 mmol/L      Chloride 105 mmol/L      CO2 30 mmol/L      ANION GAP 6 mmol/L      BUN 5 mg/dL      Creatinine 0 71 mg/dL      Glucose 104 mg/dL      Calcium 8 5 mg/dL      eGFR 108 ml/min/1 73sq m     Narrative:       [place] guidelines for Chronic Kidney Disease (CKD):     Stage 1 with normal or high GFR (GFR > 90 mL/min/1 73 square meters)    Stage 2 Mild CKD (GFR = 60-89 mL/min/1 73 square meters)    Stage 3A Moderate CKD (GFR = 45-59 mL/min/1 73 square meters)    Stage 3B Moderate CKD (GFR = 30-44 mL/min/1 73 square meters)    Stage 4 Severe CKD (GFR = 15-29 mL/min/1 73 square meters)    Stage 5 End Stage CKD (GFR <15 mL/min/1 73 square meters)  Note: GFR calculation is accurate only with a steady state creatinine    CBC and differential [[id number]]  (Abnormal) Collected:  01/20/20 1408    Lab Status:  Final result Specimen:  Blood from Arm, Right Updated:  01/20/20 1415     WBC 11 49 Thousand/uL      RBC 4 50 Million/uL      Hemoglobin 10 5 g/dL      Hematocrit 34 8 %      MCV 77 fL      MCH 23 3 pg      MCHC 30 2 g/dL      RDW 15 9 %      MPV 10 7 fL      Platelets 324 Thousands/uL      nRBC 0 /100 WBCs      Neutrophils Relative 79 %      Immat GRANS % 0 %      Lymphocytes Relative 13 %      Monocytes Relative 8 %      Eosinophils Relative 0 %      Basophils Relative 0 %      Neutrophils Absolute 9 07 Thousands/µL      Immature Grans Absolute 0 04 Thousand/uL      Lymphocytes Absolute 1 45 Thousands/µL      Monocytes Absolute 0 89 Thousand/µL      Eosinophils Absolute 0 03 Thousand/µL      Basophils Absolute 0 01 Thousands/µL                  XR chest 2 views   Final Result by Danny Rand MD (01/20 1535)      No acute cardiopulmonary disease              Workstation performed: [id number] CT soft tissue neck with contrast   Final Result by Sloe Oakley MD (01/20 6397)      No indication of abscess  Right facial cellulitis which appears odontogenic in origin based on location of primary swelling along the lingual surface of the mid body of the mandible  Transspatial cellulitis with reactive adenopathy  Mild secondary    right submandibular sialoadenitis               Workstation performed: [id number]                    Procedures  Procedures         ED Course  Patient's lab revealed a leukocytosis and CT did not show any evidence of abscess  3:49 PM:  [place] text to Dr Gely Artis - will admit to OBS                          MDM     Differential diagnosis includes:  1  Facial swelling  2  Right facial cellulitis  3  Right anterior neck swelling  4  Right submandibular swelling   5  Rule out abscess formation  6  Influenza B     Disposition  Final diagnoses:   Facial cellulitis   Right facial swelling   Influenza B     Time reflects when diagnosis was documented in both MDM as applicable and the Disposition within this note     Time User Action Codes Description Comment    1/20/2020  4:00 PM Gordo Crisostomo Add [V08 956] Facial cellulitis     1/20/2020  4:01 PM [place] GRAJEDA Add [R22 0] Right facial swelling     1/20/2020  4:07 PM Gordo Crisostomo Add [J10 1] Influenza B       ED Disposition     ED Disposition Condition Date/Time Comment    Admit Stable Mon Jan 20, 2020  4:00 PM Case was discussed with Dr Gely Artis and the patient's admission status was agreed to be Admission Status: observation status to the service of Dr Gely Artis   Follow-up Information    None         Patient's Medications   Discharge Prescriptions    No medications on file     No discharge procedures on file      ED Provider  Electronically Signed by           Socrates Hernandez DO  01/20/20 2393

## 2020-01-20 NOTE — QUICK NOTE
Discussed case with ENT, Dr Angy Manzanares, does not appear to be sialodenitis in nature, likely originating from tooth  Will continue with antibiotics patient will need oral surgeon follow-up will cancel ENT consult

## 2020-01-20 NOTE — ASSESSMENT & PLAN NOTE
He is a 43-year-old female presenting with right facial swelling, recently diagnosed with the flu a few days ago which has been resolving      -CT soft tissue neck is negative for any abscess, right facial cellulitis appears a down to Gen neck in origin, mild secondary right submandibular sialoadenitis  -will continue with Unasyn  -clear liquid diet  -ENT input will be appreciated

## 2020-01-20 NOTE — PLAN OF CARE
Problem: PAIN - ADULT  Goal: Verbalizes/displays adequate comfort level or baseline comfort level  Description  Interventions:  - Encourage patient to monitor pain and request assistance  - Assess pain using appropriate pain scale  - Administer analgesics based on type and severity of pain and evaluate response  - Implement non-pharmacological measures as appropriate and evaluate response  - Consider cultural and social influences on pain and pain management  - Notify physician/advanced practitioner if interventions unsuccessful or patient reports new pain  Outcome: Progressing     Problem: INFECTION - ADULT  Goal: Absence or prevention of progression during hospitalization  Description  INTERVENTIONS:  - Assess and monitor for signs and symptoms of infection  - Monitor lab/diagnostic results  - Monitor all insertion sites, i e  indwelling lines, tubes, and drains  - Monitor endotracheal if appropriate and nasal secretions for changes in amount and color  - [place] appropriate cooling/warming therapies per order  - Administer medications as ordered  - Instruct and encourage patient and family to use good hand hygiene technique  - Identify and instruct in appropriate isolation precautions for identified infection/condition  Outcome: Progressing  Goal: Absence of fever/infection during neutropenic period  Description  INTERVENTIONS:  - Monitor WBC    Outcome: Progressing     Problem: SAFETY ADULT  Goal: Patient will remain free of falls  Description  INTERVENTIONS:  - Assess patient frequently for physical needs  -  Identify cognitive and physical deficits and behaviors that affect risk of falls    -  [place] fall precautions as indicated by assessment   - Educate patient/family on patient safety including physical limitations  - Instruct patient to call for assistance with activity based on assessment  - Modify environment to reduce risk of injury  - Consider OT/PT consult to assist with strengthening/mobility  Outcome: Progressing  Goal: Maintain or return to baseline ADL function  Description  INTERVENTIONS:  -  Assess patient's ability to carry out ADLs; assess patient's baseline for ADL function and identify physical deficits which impact ability to perform ADLs (bathing, care of mouth/teeth, toileting, grooming, dressing, etc )  - Assess/evaluate cause of self-care deficits   - Assess range of motion  - Assess patient's mobility; develop plan if impaired  - Assess patient's need for assistive devices and provide as appropriate  - Encourage maximum independence but intervene and supervise when necessary  - Involve family in performance of ADLs  - Assess for home care needs following discharge   - Consider OT consult to assist with ADL evaluation and planning for discharge  - Provide patient education as appropriate  Outcome: Progressing  Goal: Maintain or return mobility status to optimal level  Description  INTERVENTIONS:  - Assess patient's baseline mobility status (ambulation, transfers, stairs, etc )    - Identify cognitive and physical deficits and behaviors that affect mobility  - Identify mobility aids required to assist with transfers and/or ambulation (gait belt, sit-to-stand, lift, walker, cane, etc )  - [place] fall precautions as indicated by assessment  - Record patient progress and toleration of activity level on Mobility SBAR; progress patient to next Phase/Stage  - Instruct patient to call for assistance with activity based on assessment  - Consider rehabilitation consult to assist with strengthening/weightbearing, etc   Outcome: Progressing     Problem: DISCHARGE PLANNING  Goal: Discharge to home or other facility with appropriate resources  Description  INTERVENTIONS:  - Identify barriers to discharge w/patient and caregiver  - Arrange for needed discharge resources and transportation as appropriate  - Identify discharge learning needs (meds, wound care, etc )  - Arrange for interpretive services to assist at discharge as needed  - Refer to Case Management Department for coordinating discharge planning if the patient needs post-hospital services based on physician/advanced practitioner order or complex needs related to functional status, cognitive ability, or social support system  Outcome: Progressing     Problem: Knowledge Deficit  Goal: Patient/family/caregiver demonstrates understanding of disease process, treatment plan, medications, and discharge instructions  Description  Complete learning assessment and assess knowledge base    Interventions:  - Provide teaching at level of understanding  - Provide teaching via preferred learning methods  Outcome: Progressing

## 2020-01-20 NOTE — H&P
H&P- Stanislav Singh 1980, 46 y o  female MRN: [id number]    Unit/Bed#: ED 15 Encounter: [id number]    Primary Care Provider: No primary care provider on file  Date and time admitted to hospital: 1/20/2020  1:23 PM        * Facial swelling  Assessment & Plan  He is a 60-year-old female presenting with right facial swelling, recently diagnosed with the flu a few days ago which has been resolving  -CT soft tissue neck is negative for any abscess, right facial cellulitis appears a down to Gen neck in origin, mild secondary right submandibular sialoadenitis  -will continue with Unasyn  -clear liquid diet  -ENT input will be appreciated    Influenza B  Assessment & Plan  Symptoms resolving  Maintain droplet precaution        VTE Prophylaxis: Low risk, early ambulation  /    Code Status: FULL  POLST: There is no POLST form on file for this patient (pre-hospital)    Anticipated Length of Stay:  Patient will be admitted on an Observation basis with an anticipated length of stay of  less than 2 midnights  Justification for Hospital Stay:  Right facial cellulitis    Total Time for Visit, including Counseling / Coordination of Care: 30 minutes  Greater than 50% of this total time spent on direct patient counseling and coordination of care  Chief Complaint:   Right facial swelling    History of Present Illness:    Stanislav Singh is a 46 y o  female who presents with 1 day history of right facial swelling  Patient recently diagnosed with influenza B, was not given Tamiflu as she had symptoms greater than 48 hours  Her symptoms for flu are resolving  She states she woke up and started having swelling of her right face with some pain  Denies any cough, congestion, fever, chills  No or diet no or dysphagia  No respiratory compromise  Patient denies any dental for tooth pain  Upon arrival to the ED patient's vitals stable, patient will be observed further management and evaluation      Review of Systems:    Review of Systems   Constitutional: Negative  HENT: Positive for facial swelling  Eyes: Negative  Respiratory: Negative  Cardiovascular: Negative  Gastrointestinal: Negative  Endocrine: Negative  Genitourinary: Negative  Musculoskeletal: Negative  Skin: Negative  Allergic/Immunologic: Negative  Neurological: Negative  Hematological: Negative  Psychiatric/Behavioral: Negative  Past Medical and Surgical History:     Past Medical History:   Diagnosis Date    Renal disorder        Past Surgical History:   Procedure Laterality Date     SECTION      CHOLECYSTECTOMY      NC CYSTO/URETERO W/LITHOTRIPSY &INDWELL STENT INSRT Left 2018    Procedure: CYSTOSCOPY URETEROSCOPY WITH LITHOTRIPSY HOLMIUM LASER, RETROGRADE PYELOGRAM AND INSERTION STENT URETERAL;  Surgeon: Liz Cardenas MD;  Location: [facility];  Service: Urology    TUBAL LIGATION         Meds/Allergies:    Prior to Admission medications    Medication Sig Start Date End Date Taking? Authorizing Provider   acetaminophen (TYLENOL) 325 mg tablet Take 2 tablets (650 mg total) by mouth every 6 (six) hours as needed for mild pain 18   Birgit Thomaser, DO   ondansetron (ZOFRAN-ODT) 4 mg disintegrating tablet Take 1 tablet (4 mg total) by mouth every 8 (eight) hours as needed for nausea or vomiting 1/15/20   Argenis , DO   oxybutynin (DITROPAN) 5 mg tablet Take 1 tablet (5 mg total) by mouth 3 (three) times a day as needed (stent discomfort) 18   Elisa Zamora PA-C   tamsulosin (FLOMAX) 0 4 mg Take 1 capsule (0 4 mg total) by mouth daily with dinner 18   Elisa Zamora PA-C     I have reviewed home medications with patient personally      Allergies: No Known Allergies    Social History:     Social History     Substance and Sexual Activity   Alcohol Use No     Social History     Tobacco Use   Smoking Status Never Smoker   Smokeless Tobacco Never Used     Social History     Substance and Sexual Activity   Drug Use No       Family History:    History reviewed  No pertinent family history  Physical Exam:     Vitals:   Blood Pressure: 133/58 (01/20/20 1209)  Pulse: (!) 118 (01/20/20 1209)  Temperature: 98 1 °F (36 7 °C) (01/20/20 1209)  Temp Source: Oral (01/20/20 1209)  Respirations: 18 (01/20/20 1209)  Height: 5' 2" (157 5 cm) (01/20/20 1209)  Weight - Scale: 95 3 kg (210 lb) (01/20/20 1209)  SpO2: 98 % (01/20/20 1209)    Constitutional: Patient is oriented to person, place and time, no acute distress  HEENT:  Right facial swelling and pain to palpation  Cardiovascular: Normal S1S2, RRR, No murmurs/rubs/gallops appreciated  Pulmonary:  Bilateral air entry, No rhonchi/rales/wheezing appreciated  Abdominal: Soft, Bowel sounds present, Non-tender, Non-distended, No rebound/guarding, no hepatomegaly   Extremities:  No cyanosis, clubbing or edema  Peripheral pulses palpable and equal bilaterally    Additional Data:     Lab Results: I have personally reviewed pertinent reports  Results from last 7 days   Lab Units 01/20/20  1408   WBC Thousand/uL 11 49*   HEMOGLOBIN g/dL 10 5*   HEMATOCRIT % 34 8   PLATELETS Thousands/uL 414*   NEUTROS PCT % 79*   LYMPHS PCT % 13*   MONOS PCT % 8   EOS PCT % 0     Results from last 7 days   Lab Units 01/20/20  1408   POTASSIUM mmol/L 3 0*   CHLORIDE mmol/L 105   CO2 mmol/L 30   BUN mg/dL 5   CREATININE mg/dL 0 71   CALCIUM mg/dL 8 5   ALK PHOS U/L 116   ALT U/L 54   AST U/L 39           Imaging: I have personally reviewed pertinent reports  Xr Chest 2 Views    Result Date: 1/20/2020  Narrative: CHEST INDICATION:   Cough/fever/R facial swelling  COMPARISON:  None EXAM PERFORMED/VIEWS:  XR CHEST PA & LATERAL FINDINGS: Cardiomediastinal silhouette appears unremarkable  The lungs are clear  No pneumothorax or pleural effusion  Osseous structures appear within normal limits for patient age       Impression: No acute cardiopulmonary disease  Workstation performed: [id number]     Ct Soft Tissue Neck With Contrast    Result Date: 1/20/2020  Narrative: CT NECK WITH CONTRAST INDICATION:   Facial swelling/R neck swelling  COMPARISON:  None  TECHNIQUE:  Axial, sagittal, and coronal 2D reformatted images were created from the axial source data and submitted for interpretation  Radiation dose length product (DLP) for this visit:  561 mGy-cm   This examination, like all CT scans performed in the [facility], was performed utilizing techniques to minimize radiation dose exposure, including the use of iterative reconstruction and automated exposure control  IV Contrast:  85 mL of iohexol (OMNIPAQUE) IMAGE QUALITY:  Diagnostic  FINDINGS: VISUALIZED BRAIN PARENCHYMA:  No acute intracranial pathology of the visualized brain parenchyma  VISUALIZED ORBITS AND PARANASAL SINUSES:  Scattered sinus mucosal disease  NASAL CAVITY AND NASOPHARYNX:  Normal  SUPRAHYOID NECK:  Cellulitis with the edema within the soft tissues along the buccal surface of the mid body of the mandible  Edematous extension into the platysma muscle, submandibular space and right submandibular gland  No focal abscess is evident  No definite bone destruction  Parapharyngeal,  and sublingual spaces are normal  INFRAHYOID NECK:  Aryepiglottic folds and piriform sinuses are normal   Normal glottis and subglottic airway  THYROID GLAND:  Unremarkable  PAROTID AND SUBMANDIBULAR GLANDS:  Right submandibular gland is slightly swollen and minimally enhancing consistent with mild sialoadenitis  This does not appear to be the primary source of infection  LYMPH NODES:  Reactive adenopathy right level 1B chains  VASCULAR STRUCTURES:  Normal enhancement of the cervical vasculature  THORACIC INLET:  Lung apices and upper mediastinum are unremarkable  BONY STRUCTURES: No acute fracture or destructive osseous lesion    There is a crack in crown of the 2nd to the last right mandibular molar in a subtle periapical lucency along the anterior tooth root series 3 image 46  This could be the source of the inflammatory process  Multifocal emphysema thought to be related to venous access/injection  Impression: No indication of abscess  Right facial cellulitis which appears odontogenic in origin based on location of primary swelling along the lingual surface of the mid body of the mandible  Transspatial cellulitis with reactive adenopathy  Mild secondary right submandibular sialoadenitis    Workstation performed: [id number]       Allscripts / Epic Records Reviewed: Yes     ** Please Note: This note has been constructed using a voice recognition system   **

## 2020-01-21 PROBLEM — E87.6 HYPOKALEMIA: Status: ACTIVE | Noted: 2020-01-21

## 2020-01-21 PROBLEM — L03.211 FACIAL CELLULITIS: Status: ACTIVE | Noted: 2020-01-20

## 2020-01-21 PROBLEM — D63.8 ANEMIA, CHRONIC DISEASE: Status: ACTIVE | Noted: 2020-01-21

## 2020-01-21 LAB
ALBUMIN SERPL BCP-MCNC: 2.4 G/DL (ref 3.5–5)
ALP SERPL-CCNC: 93 U/L (ref 46–116)
ALT SERPL W P-5'-P-CCNC: 44 U/L (ref 12–78)
ANION GAP SERPL CALCULATED.3IONS-SCNC: 11 MMOL/L (ref 4–13)
AST SERPL W P-5'-P-CCNC: 28 U/L (ref 5–45)
BILIRUB SERPL-MCNC: 0.3 MG/DL (ref 0.2–1)
BUN SERPL-MCNC: 8 MG/DL (ref 5–25)
CALCIUM SERPL-MCNC: 8.4 MG/DL (ref 8.3–10.1)
CHLORIDE SERPL-SCNC: 107 MMOL/L (ref 100–108)
CO2 SERPL-SCNC: 23 MMOL/L (ref 21–32)
CREAT SERPL-MCNC: 0.63 MG/DL (ref 0.6–1.3)
ERYTHROCYTE [DISTWIDTH] IN BLOOD BY AUTOMATED COUNT: 16 % (ref 11.6–15.1)
FERRITIN SERPL-MCNC: 13 NG/ML (ref 8–388)
GFR SERPL CREATININE-BSD FRML MDRD: 114 ML/MIN/1.73SQ M
GLUCOSE P FAST SERPL-MCNC: 126 MG/DL (ref 65–99)
GLUCOSE SERPL-MCNC: 126 MG/DL (ref 65–140)
HCT VFR BLD AUTO: 29.9 % (ref 34.8–46.1)
HGB BLD-MCNC: 9.2 G/DL (ref 11.5–15.4)
IRON SATN MFR SERPL: 6 %
IRON SERPL-MCNC: 25 UG/DL (ref 50–170)
MCH RBC QN AUTO: 23.7 PG (ref 26.8–34.3)
MCHC RBC AUTO-ENTMCNC: 30.8 G/DL (ref 31.4–37.4)
MCV RBC AUTO: 77 FL (ref 82–98)
PLATELET # BLD AUTO: 379 THOUSANDS/UL (ref 149–390)
PMV BLD AUTO: 10.9 FL (ref 8.9–12.7)
POTASSIUM SERPL-SCNC: 3.1 MMOL/L (ref 3.5–5.3)
PROT SERPL-MCNC: 6.7 G/DL (ref 6.4–8.2)
RBC # BLD AUTO: 3.89 MILLION/UL (ref 3.81–5.12)
SODIUM SERPL-SCNC: 141 MMOL/L (ref 136–145)
TIBC SERPL-MCNC: 445 UG/DL (ref 250–450)
TSH SERPL DL<=0.05 MIU/L-ACNC: 1.14 UIU/ML (ref 0.36–3.74)
WBC # BLD AUTO: 12.88 THOUSAND/UL (ref 4.31–10.16)

## 2020-01-21 PROCEDURE — 80053 COMPREHEN METABOLIC PANEL: CPT | Performed by: INTERNAL MEDICINE

## 2020-01-21 PROCEDURE — 82728 ASSAY OF FERRITIN: CPT | Performed by: INTERNAL MEDICINE

## 2020-01-21 PROCEDURE — 83550 IRON BINDING TEST: CPT | Performed by: INTERNAL MEDICINE

## 2020-01-21 PROCEDURE — 84443 ASSAY THYROID STIM HORMONE: CPT | Performed by: INTERNAL MEDICINE

## 2020-01-21 PROCEDURE — 85027 COMPLETE CBC AUTOMATED: CPT | Performed by: INTERNAL MEDICINE

## 2020-01-21 PROCEDURE — 83918 ORGANIC ACIDS TOTAL QUANT: CPT | Performed by: INTERNAL MEDICINE

## 2020-01-21 PROCEDURE — 83540 ASSAY OF IRON: CPT | Performed by: INTERNAL MEDICINE

## 2020-01-21 PROCEDURE — 82747 ASSAY OF FOLIC ACID RBC: CPT | Performed by: INTERNAL MEDICINE

## 2020-01-21 PROCEDURE — 99233 SBSQ HOSP IP/OBS HIGH 50: CPT | Performed by: INTERNAL MEDICINE

## 2020-01-21 RX ORDER — TRAMADOL HYDROCHLORIDE 50 MG/1
50 TABLET ORAL EVERY 6 HOURS PRN
Status: COMPLETED | OUTPATIENT
Start: 2020-01-21 | End: 2020-01-21

## 2020-01-21 RX ORDER — BENZONATATE 100 MG/1
100 CAPSULE ORAL 3 TIMES DAILY
Status: DISCONTINUED | OUTPATIENT
Start: 2020-01-21 | End: 2020-01-22 | Stop reason: HOSPADM

## 2020-01-21 RX ORDER — LACTOBACILLUS ACIDOPHILUS / LACTOBACILLUS BULGARICUS 100 MILLION CFU STRENGTH
1 GRANULES ORAL
Status: DISCONTINUED | OUTPATIENT
Start: 2020-01-21 | End: 2020-01-22 | Stop reason: HOSPADM

## 2020-01-21 RX ORDER — KETOROLAC TROMETHAMINE 30 MG/ML
15 INJECTION, SOLUTION INTRAMUSCULAR; INTRAVENOUS ONCE
Status: COMPLETED | OUTPATIENT
Start: 2020-01-21 | End: 2020-01-21

## 2020-01-21 RX ORDER — POTASSIUM CHLORIDE 20 MEQ/1
40 TABLET, EXTENDED RELEASE ORAL ONCE
Status: COMPLETED | OUTPATIENT
Start: 2020-01-21 | End: 2020-01-21

## 2020-01-21 RX ORDER — HYDROCODONE BITARTRATE AND ACETAMINOPHEN 5; 325 MG/1; MG/1
2 TABLET ORAL EVERY 6 HOURS PRN
Status: DISCONTINUED | OUTPATIENT
Start: 2020-01-21 | End: 2020-01-22 | Stop reason: HOSPADM

## 2020-01-21 RX ORDER — ACETAMINOPHEN 325 MG/1
650 TABLET ORAL EVERY 6 HOURS PRN
Status: DISCONTINUED | OUTPATIENT
Start: 2020-01-21 | End: 2020-01-22 | Stop reason: HOSPADM

## 2020-01-21 RX ORDER — FAMOTIDINE 20 MG/1
20 TABLET, FILM COATED ORAL 2 TIMES DAILY
Status: DISCONTINUED | OUTPATIENT
Start: 2020-01-21 | End: 2020-01-22 | Stop reason: HOSPADM

## 2020-01-21 RX ORDER — TRAMADOL HYDROCHLORIDE 50 MG/1
50 TABLET ORAL EVERY 6 HOURS PRN
Status: DISCONTINUED | OUTPATIENT
Start: 2020-01-21 | End: 2020-01-22 | Stop reason: HOSPADM

## 2020-01-21 RX ORDER — POTASSIUM CHLORIDE AND SODIUM CHLORIDE 900; 300 MG/100ML; MG/100ML
75 INJECTION, SOLUTION INTRAVENOUS CONTINUOUS
Status: DISCONTINUED | OUTPATIENT
Start: 2020-01-21 | End: 2020-01-22

## 2020-01-21 RX ADMIN — TRAMADOL HYDROCHLORIDE 50 MG: 50 TABLET, FILM COATED ORAL at 00:52

## 2020-01-21 RX ADMIN — TRAMADOL HYDROCHLORIDE 50 MG: 50 TABLET, FILM COATED ORAL at 06:55

## 2020-01-21 RX ADMIN — BENZONATATE 100 MG: 100 CAPSULE ORAL at 19:21

## 2020-01-21 RX ADMIN — KETOROLAC TROMETHAMINE 15 MG: 30 INJECTION, SOLUTION INTRAMUSCULAR at 14:46

## 2020-01-21 RX ADMIN — METRONIDAZOLE 500 MG: 500 INJECTION, SOLUTION INTRAVENOUS at 19:21

## 2020-01-21 RX ADMIN — ENOXAPARIN SODIUM 40 MG: 40 INJECTION SUBCUTANEOUS at 14:47

## 2020-01-21 RX ADMIN — POTASSIUM CHLORIDE 40 MEQ: 1500 TABLET, EXTENDED RELEASE ORAL at 14:48

## 2020-01-21 RX ADMIN — Medication 1 PACKET: at 14:48

## 2020-01-21 RX ADMIN — CEFTRIAXONE SODIUM 1000 MG: 10 INJECTION, POWDER, FOR SOLUTION INTRAVENOUS at 12:12

## 2020-01-21 RX ADMIN — POTASSIUM CHLORIDE AND SODIUM CHLORIDE 75 ML/HR: 900; 300 INJECTION, SOLUTION INTRAVENOUS at 14:49

## 2020-01-21 RX ADMIN — FAMOTIDINE 20 MG: 20 TABLET ORAL at 14:48

## 2020-01-21 RX ADMIN — Medication 1 PACKET: at 19:20

## 2020-01-21 RX ADMIN — TRAMADOL HYDROCHLORIDE 50 MG: 50 TABLET, FILM COATED ORAL at 12:13

## 2020-01-21 RX ADMIN — HYDROCODONE BITARTRATE AND ACETAMINOPHEN 2 TABLET: 5; 325 TABLET ORAL at 21:47

## 2020-01-21 RX ADMIN — SODIUM CHLORIDE 125 ML/HR: 0.9 INJECTION, SOLUTION INTRAVENOUS at 06:54

## 2020-01-21 RX ADMIN — METRONIDAZOLE 500 MG: 500 INJECTION, SOLUTION INTRAVENOUS at 01:43

## 2020-01-21 RX ADMIN — METRONIDAZOLE 500 MG: 500 INJECTION, SOLUTION INTRAVENOUS at 09:32

## 2020-01-21 NOTE — ASSESSMENT & PLAN NOTE
As per patient, she is anemic chronically  Details are not really clear  Further workup on outpatient basis  Would check TSH and some basic labs

## 2020-01-21 NOTE — PROGRESS NOTES
[facility] Progress Note  Patient: Glenna Rivera 46 y o  female   MRN: [id number]  PCP: No primary care provider on file  Unit/Bed#: -[facility] Encounter: [id number]  Date Of Visit: 01/21/20          * Facial cellulitis with infected teeth-right lower   Assessment & Plan  Patient appears to have a issues with her couple of teeth-right lower jaw most likely causing infection/symptoms  On current antibiotics and improving  I discussed with Dr Leann Veronica maxillofacial-surgery  He has reviewed patient's CT scan  He has scheduled an appointment for patient for tomorrow around noon time  I have discussed about this with the patient and with the RN taking care of the patient  I have specifically asked that patient to be kept NPO after midnight as this was requested by surgeon so that he could take care of patient's teeth tomorrow in his office  Plan is for patient to be discharged tomorrow in the morning and follow-up at [facility]  I have also entered all the information on patient's discharge follow-up for tomorrow    Anemia, chronic   Assessment & Plan  As per patient, she is anemic chronically  Details are not really clear  Further workup on outpatient basis  Would check TSH and some basic labs  Hypokalemia  Assessment & Plan  Replete and follow-up as needed    Influenza B  Assessment & Plan  Supportive care  Stress incontinence-with cough  Would give her antitussive treatment/Tessalon Perles  If she continues to have this issue, she should follow-up on outpatient basis with a urologist/OBGYN    Present on Admission:   Facial cellulitis with infected teeth-right lower    Influenza B   Hypokalemia   Anemia, chronic             VTE Pharmacologic Prophylaxis:   Pharmacologic: Enoxaparin (Lovenox)  Mechanical VTE Prophylaxis in Place: Yes    Patient Centered Rounds: I have performed bedside rounds with nursing staff today      Discussions with Specialists or Other Care Team Provider:  Yes    Education and Discussions with Family / Patient:  Yes    Time Spent for Care: 40+ minutes  More than 50% of total time spent on counseling and coordination of care as described above  Current Length of Stay: 0 day(s)    Current Patient Status: Observation   Certification Statement: IV antibiotics-patient needs to stay in the hospital for    Discharge Plan:  As above    Code Status: Level 1 - Full Code      Subjective:   Still complaining of pain right lower jaw  Pain is less as compared to before and got better with Ultram   Also she could swallow things better  Recently had flu B  She also thinks that there is less redness and less swelling of her face  No chest pain  No shortness of breath  No nausea, vomiting, diarrhea  She has been having some bouts of cough when she also I think wets herself    Objective:     Vitals:   Temp (24hrs), Av 4 °F (36 9 °C), Min:97 6 °F (36 4 °C), Max:99 2 °F (37 3 °C)    Temp:  [97 6 °F (36 4 °C)-99 2 °F (37 3 °C)] 98 4 °F (36 9 °C)  HR:  [] 63  Resp:  [17-27] 17  BP: (109-120)/(53-74) 115/62  SpO2:  [96 %-98 %] 98 %  Body mass index is 38 41 kg/m²  Input and Output Summary (last 24 hours): Intake/Output Summary (Last 24 hours) at 2020 1322  Last data filed at 2020 0500  Gross per 24 hour   Intake  5 ml   Output --   Net  5 ml           Physical Exam:     Vital signs are reviewed as above  Constitutional:  Patient in bed  Patient laying comfortably  Eyes: EOM grossly intact  Conjunctivae are pale  Patient has anicteric sclera  HENT:  Swollen right lower part of her face which is also tender to touch  Also somewhat warm  Has submandibular lymphadenopathy and also few lymph nodes enlarged specially on the right side of her neck  Neck:  Enlarged lymph nodes as above  Cardiac: I did not hear any rubs or gallop  Patient appears to be in sinus rhythm    Respiratory: Patient not in significant respiratory distress  Air entry in general is good  GI: Abdomen is soft  It is grossly nontender  Bowel sounds are adequate  I was not able to appreciate any hepatosplenomegaly  Neurologic:  Patient is awake and alert  Neurological examination is grossly intact  No obvious focal neurological deficit noticed  Skin:  Slightly red Skin right lower jaw  Psychiatric: Mood and affect are pleasant  Musculoskeletal  Patient moving all extremities while in bed   Does not have left hand  Extremities: Patient has no significant cyanosis, clubbing, or lower extremity edema       Additional Data:     Labs:    Results from last 7 days   Lab Units 01/21/20  0523 01/20/20  1408   WBC Thousand/uL 12 88* 11 49*   HEMOGLOBIN g/dL 9 2* 10 5*   HEMATOCRIT % 29 9* 34 8   PLATELETS Thousands/uL 379 414*   NEUTROS PCT %  --  79*   LYMPHS PCT %  --  13*   MONOS PCT %  --  8   EOS PCT %  --  0     Results from last 7 days   Lab Units 01/21/20  0523   POTASSIUM mmol/L 3 1*   CHLORIDE mmol/L 107   CO2 mmol/L 23   BUN mg/dL 8   CREATININE mg/dL 0 63   CALCIUM mg/dL 8 4   ALK PHOS U/L 93   ALT U/L 44   AST U/L 28           * I Have Reviewed All Lab Data Listed Above  * Additional Pertinent Lab Tests Reviewed:  All Labs Within Last 24 Hours Reviewed    Imaging:    Imaging Reports Reviewed Today Include:  CT scan results      Recent Cultures (last 7 days):           Last 24 Hours Medication List:     Current Facility-Administered Medications:  acetaminophen 650 mg Oral Q6H PRN Jeaneth Deleon MD    albuterol 2 5 mg Nebulization Q6H PRN Jeaneth Deleon MD    benzonatate 100 mg Oral TID Kalpana Pederson MD    cefTRIAXone 1,000 mg Intravenous Q24H Jeaneth Deleon MD Last Rate: 1,000 mg (01/21/20 1212)   enoxaparin 40 mg Subcutaneous Once Kalpana Pederson MD    lactobacillus acidophilus-bulgaricus 1 packet Oral TID With Meals Kalpana Pederson MD    metroNIDAZOLE 500 mg Intravenous Kassi Blake MD Last Rate: 500 mg (01/21/20 0932)   ondansetron 4 mg Intravenous Q6H PRN Eliseo Lion MD    sodium chloride 125 mL/hr Intravenous Continuous Alex Irving DO Last Rate: 125 mL/hr (01/21/20 0654)   traMADol 50 mg Oral Q6H PRN Estefanía Lundberg MD         Today, Patient Was Seen By: Estefanía Lundberg MD    ** Please Note: Dragon 360 Dictation voice to text software may have been used in the creation of this document   **

## 2020-01-21 NOTE — UTILIZATION REVIEW
Initial Clinical Review    Admission: Date/Time/Statement: OBS 1/20  1602  Orders Placed This Encounter   Procedures    Place in Observation     Standing Status:   Standing     Number of Occurrences:   1     Order Specific Question:   Admitting Physician     Answer:   Jhon Archer [[id number]]     Order Specific Question:   Level of Care     Answer:   Med Surg [[id number]]     ED Arrival Information     Expected Arrival Acuity Means of Arrival Escorted By Service Admission Type    - 1/20/2020 12:05 Emergent Walk-In Self Hospitalist Emergency    Arrival Complaint    FACIAL SWELLING        Chief Complaint   Patient presents with    Facial Swelling     pt states she woke up with right sided facial swelling this morning, pt diagnosed with the flu here a few days ago     Assessment/Plan: 45 yo female to ED from home w/ 1 day hx of facial swelling   Recently dx w/ influenza B , sx greater then 48 hrs ,no tamiflu given   Admitted OBS status plan to cont unasyn , clear liq diet , ENT consult   1/21 IM note   Plan to DC in am to oral surgeon office for tooth extraction   Cont IV abx over night and NPO after MN      ED Triage Vitals [01/20/20 1209]   Temperature Pulse Respirations Blood Pressure SpO2   98 1 °F (36 7 °C) (!) 118 18 133/58 98 %      Temp Source Heart Rate Source Patient Position - Orthostatic VS BP Location FiO2 (%)   Oral Monitor Sitting Left arm --      Pain Score       6        Wt Readings from Last 1 Encounters:   01/20/20 95 3 kg (210 lb)     Additional Vital Signs:   01/21/20 0802  98 4 °F (36 9 °C)  63  17  115/62  --  98 %  None (Room air       01/20/20 2312  99 2 °F (37 3 °C)  105  20  117/64  84  98 %  None (Room air)  Lying   01/20/20 2013  97 6 °F (36 4 °C)  91  19  109/53  77  96 %  None (Room air)  Lying   01/20/20 1936  --  101  27Abnormal   119/74  --  97 %  None (Room air)  Lying   01/20/20 1832  --  102  18  120/69  --  98 %  None (Room air)  Lying   01/20/20 1331  --  --  --  --  --  --  None (Room air         Pertinent Labs/Diagnostic Test Results:   1/20 CT soft tissue neck    No indication of abscess  Right facial cellulitis which appears odontogenic in origin based on location of primary swelling along the lingual surface of the mid body of the mandible  Transspatial cellulitis with reactive adenopathy  Mild secondary   right submandibular sialoadenitis        1/20 CXR wnl   Results from last 7 days   Lab Units 01/21/20  0523 01/20/20  1408   WBC Thousand/uL 12 88* 11 49*   HEMOGLOBIN g/dL 9 2* 10 5*   HEMATOCRIT % 29 9* 34 8   PLATELETS Thousands/uL 379 414*   NEUTROS ABS Thousands/µL  --  9 07*     Results from last 7 days   Lab Units 01/21/20  0523 01/20/20  1408   SODIUM mmol/L 141 141   POTASSIUM mmol/L 3 1* 3 0*   CHLORIDE mmol/L 107 105   CO2 mmol/L 23 30   ANION GAP mmol/L 11 6   BUN mg/dL 8 5   CREATININE mg/dL 0 63 0 71   EGFR ml/min/1 73sq m 114 108   CALCIUM mg/dL 8 4 8 5     Results from last 7 days   Lab Units 01/21/20  0523 01/20/20  1408   AST U/L 28 39   ALT U/L 44 54   ALK PHOS U/L 93 116   TOTAL PROTEIN g/dL 6 7 7 7   ALBUMIN g/dL 2 4* 2 9*   TOTAL BILIRUBIN mg/dL 0 30 0 30   BILIRUBIN DIRECT mg/dL  --  0 08     Results from last 7 days   Lab Units 01/21/20  0523 01/20/20  1408   GLUCOSE RANDOM mg/dL 126 104       Results from last 7 days   Lab Units 01/15/20  0651   INFLUENZA A PCR  None Detected   RSV PCR  None Detected     ED Treatment:   Medication Administration from 01/20/2020 1205 to 01/20/2020 1949       Date/Time Order Dose Route Action     01/20/2020 1418 sodium chloride 0 9 % bolus 1,000 mL 1,000 mL Intravenous New Bag     01/20/2020 1411 ketorolac (TORADOL) injection 15 mg 15 mg Intravenous Given     01/20/2020 1415 cefTRIAXone (ROCEPHIN) IVPB (premix) 2,000 mg 2,000 mg Intravenous New Bag     01/20/2020 1410 methylPREDNISolone sodium succinate (Solu-MEDROL) injection 125 mg 125 mg Intravenous Given     01/20/2020 1456 albuterol inhalation solution 2 5 mg 2 5 mg Nebulization Given     01/20/2020 1456 ipratropium (ATROVENT) 0 02 % inhalation solution 0 5 mg 0 5 mg Nebulization Given     01/20/2020 1728 sodium chloride 0 9 % infusion 125 mL/hr Intravenous New Bag     01/20/2020 1722 potassium chloride 20 mEq IVPB (premix) 20 mEq Intravenous New Bag     01/20/2020 1829 metroNIDAZOLE (FLAGYL) IVPB (premix) 500 mg 500 mg Intravenous New Bag     01/20/2020 1830 potassium chloride (K-DUR,KLOR-CON) CR tablet 40 mEq 40 mEq Oral Given        Past Medical History:   Diagnosis Date    Renal disorder      Present on Admission:  **None**      Admitting Diagnosis: Sialoadenitis [K11 20]  Influenza B [J10 1]  Facial swelling [R22 0]  Right facial swelling [R22 0]  Facial cellulitis [L03 211]  Age/Sex: 46 y o  female  Admission Orders:  Scheduled Medications:    Medications:  cefTRIAXone 1,000 mg Intravenous Q24H   metroNIDAZOLE 500 mg Intravenous Q8H     Continuous IV Infusions:    sodium chloride 125 mL/hr Intravenous Continuous     PRN Meds:    acetaminophen 650 mg Oral Q6H PRN   albuterol 2 5 mg Nebulization Q6H PRN   ondansetron 4 mg Intravenous Q6H PRN     Clear liq diet   amb pt   Up and OOB as jesse     Network Utilization Review Department  [email or web address]  ATTENTION: Please call with any questions or concerns to [phone] and carefully listen to the prompts so that you are directed to the right person  All voicemails are confidential   Rony Los all requests for admission clinical reviews, approved or denied determinations and any other requests to dedicated fax number below belonging to the campus where the patient is receiving treatment   List of dedicated fax numbers for the Facilities:  FACILITY NAME UR FAX NUMBER   ADMISSION DENIALS (Administrative/Medical Necessity) [phone]   [address] (Maternity/NICU/Pediatrics) [phone]   [facility] [address] [facility] [address] [facility] [place] [phone]   [facility] [address] [place] [address] [phone]   [facility]  [phone]   [place] [address] [phone]   [address] [place] [address] [phone]

## 2020-01-21 NOTE — ASSESSMENT & PLAN NOTE
Patient appears to have a issues with her couple of teeth-right lower jaw most likely causing infection/symptoms  On current antibiotics and improving  I discussed with Dr Ash Barr maxillofacial-surgery  He has reviewed patient's CT scan  He has scheduled an appointment for patient for tomorrow around noon time  I have discussed about this with the patient and with the RN taking care of the patient  I have specifically asked that patient to be kept NPO after midnight as this was requested by surgeon so that he could take care of patient's teeth tomorrow in his office  Plan is for patient to be discharged tomorrow in the morning and follow-up at [facility]      I have also entered all the information on patient's discharge follow-up for tomorrow

## 2020-01-22 VITALS
HEART RATE: 70 BPM | BODY MASS INDEX: 38.64 KG/M2 | HEIGHT: 62 IN | RESPIRATION RATE: 19 BRPM | OXYGEN SATURATION: 96 % | WEIGHT: 210 LBS | DIASTOLIC BLOOD PRESSURE: 70 MMHG | TEMPERATURE: 99 F | SYSTOLIC BLOOD PRESSURE: 149 MMHG

## 2020-01-22 LAB
ANION GAP SERPL CALCULATED.3IONS-SCNC: 9 MMOL/L (ref 4–13)
BASOPHILS # BLD AUTO: 0.02 THOUSANDS/ΜL (ref 0–0.1)
BASOPHILS NFR BLD AUTO: 0 % (ref 0–1)
BUN SERPL-MCNC: 13 MG/DL (ref 5–25)
CALCIUM SERPL-MCNC: 8 MG/DL (ref 8.3–10.1)
CHLORIDE SERPL-SCNC: 113 MMOL/L (ref 100–108)
CO2 SERPL-SCNC: 22 MMOL/L (ref 21–32)
CREAT SERPL-MCNC: 0.67 MG/DL (ref 0.6–1.3)
EOSINOPHIL # BLD AUTO: 0.02 THOUSAND/ΜL (ref 0–0.61)
EOSINOPHIL NFR BLD AUTO: 0 % (ref 0–6)
ERYTHROCYTE [DISTWIDTH] IN BLOOD BY AUTOMATED COUNT: 16.3 % (ref 11.6–15.1)
GFR SERPL CREATININE-BSD FRML MDRD: 112 ML/MIN/1.73SQ M
GLUCOSE P FAST SERPL-MCNC: 104 MG/DL (ref 65–99)
GLUCOSE SERPL-MCNC: 104 MG/DL (ref 65–140)
HCT VFR BLD AUTO: 27.9 % (ref 34.8–46.1)
HGB BLD-MCNC: 8.5 G/DL (ref 11.5–15.4)
IMM GRANULOCYTES # BLD AUTO: 0.1 THOUSAND/UL (ref 0–0.2)
IMM GRANULOCYTES NFR BLD AUTO: 1 % (ref 0–2)
LYMPHOCYTES # BLD AUTO: 2.06 THOUSANDS/ΜL (ref 0.6–4.47)
LYMPHOCYTES NFR BLD AUTO: 18 % (ref 14–44)
MCH RBC QN AUTO: 23.5 PG (ref 26.8–34.3)
MCHC RBC AUTO-ENTMCNC: 30.5 G/DL (ref 31.4–37.4)
MCV RBC AUTO: 77 FL (ref 82–98)
MONOCYTES # BLD AUTO: 0.89 THOUSAND/ΜL (ref 0.17–1.22)
MONOCYTES NFR BLD AUTO: 8 % (ref 4–12)
NEUTROPHILS # BLD AUTO: 8.3 THOUSANDS/ΜL (ref 1.85–7.62)
NEUTS SEG NFR BLD AUTO: 73 % (ref 43–75)
NRBC BLD AUTO-RTO: 0 /100 WBCS
PLATELET # BLD AUTO: 379 THOUSANDS/UL (ref 149–390)
PMV BLD AUTO: 10.8 FL (ref 8.9–12.7)
POTASSIUM SERPL-SCNC: 3.7 MMOL/L (ref 3.5–5.3)
RBC # BLD AUTO: 3.61 MILLION/UL (ref 3.81–5.12)
SODIUM SERPL-SCNC: 144 MMOL/L (ref 136–145)
WBC # BLD AUTO: 11.39 THOUSAND/UL (ref 4.31–10.16)

## 2020-01-22 PROCEDURE — 85025 COMPLETE CBC W/AUTO DIFF WBC: CPT | Performed by: INTERNAL MEDICINE

## 2020-01-22 PROCEDURE — 99239 HOSP IP/OBS DSCHRG MGMT >30: CPT | Performed by: INTERNAL MEDICINE

## 2020-01-22 PROCEDURE — 80048 BASIC METABOLIC PNL TOTAL CA: CPT | Performed by: INTERNAL MEDICINE

## 2020-01-22 RX ORDER — TRAMADOL HYDROCHLORIDE 50 MG/1
50 TABLET ORAL EVERY 6 HOURS PRN
Qty: 30 TABLET | Refills: 0 | Status: SHIPPED | OUTPATIENT
Start: 2020-01-22 | End: 2020-02-01

## 2020-01-22 RX ORDER — AMOXICILLIN AND CLAVULANATE POTASSIUM 875; 125 MG/1; MG/1
1 TABLET, FILM COATED ORAL EVERY 12 HOURS SCHEDULED
Qty: 14 TABLET | Refills: 0 | Status: SHIPPED | OUTPATIENT
Start: 2020-01-22 | End: 2020-01-29

## 2020-01-22 RX ORDER — AMOXICILLIN AND CLAVULANATE POTASSIUM 875; 125 MG/1; MG/1
1 TABLET, FILM COATED ORAL EVERY 12 HOURS SCHEDULED
Status: DISCONTINUED | OUTPATIENT
Start: 2020-01-22 | End: 2020-01-22 | Stop reason: HOSPADM

## 2020-01-22 RX ORDER — LACTOBACILLUS ACIDOPHILUS / LACTOBACILLUS BULGARICUS 100 MILLION CFU STRENGTH
1 GRANULES ORAL
Qty: 30 PACKET | Refills: 0 | Status: SHIPPED | OUTPATIENT
Start: 2020-01-22 | End: 2020-02-01

## 2020-01-22 RX ORDER — BENZONATATE 100 MG/1
100 CAPSULE ORAL 3 TIMES DAILY
Qty: 20 CAPSULE | Refills: 0 | Status: SHIPPED | OUTPATIENT
Start: 2020-01-22

## 2020-01-22 RX ADMIN — POTASSIUM CHLORIDE AND SODIUM CHLORIDE 75 ML/HR: 900; 300 INJECTION, SOLUTION INTRAVENOUS at 04:27

## 2020-01-22 RX ADMIN — MORPHINE SULFATE 2 MG: 2 INJECTION, SOLUTION INTRAMUSCULAR; INTRAVENOUS at 04:27

## 2020-01-22 RX ADMIN — AMOXICILLIN AND CLAVULANATE POTASSIUM 1 TABLET: 875; 125 TABLET, FILM COATED ORAL at 09:03

## 2020-01-22 RX ADMIN — ONDANSETRON 4 MG: 2 INJECTION INTRAMUSCULAR; INTRAVENOUS at 03:54

## 2020-01-22 RX ADMIN — METRONIDAZOLE 500 MG: 500 INJECTION, SOLUTION INTRAVENOUS at 03:17

## 2020-01-22 RX ADMIN — TRAMADOL HYDROCHLORIDE 50 MG: 50 TABLET, FILM COATED ORAL at 09:03

## 2020-01-22 NOTE — PLAN OF CARE
Problem: PAIN - ADULT  Goal: Verbalizes/displays adequate comfort level or baseline comfort level  Description  Interventions:  - Encourage patient to monitor pain and request assistance  - Assess pain using appropriate pain scale  - Administer analgesics based on type and severity of pain and evaluate response  - Implement non-pharmacological measures as appropriate and evaluate response  - Consider cultural and social influences on pain and pain management  - Notify physician/advanced practitioner if interventions unsuccessful or patient reports new pain  Outcome: Progressing     Problem: INFECTION - ADULT  Goal: Absence or prevention of progression during hospitalization  Description  INTERVENTIONS:  - Assess and monitor for signs and symptoms of infection  - Monitor lab/diagnostic results  - Monitor all insertion sites, i e  indwelling lines, tubes, and drains  - Monitor endotracheal if appropriate and nasal secretions for changes in amount and color  - [place] appropriate cooling/warming therapies per order  - Administer medications as ordered  - Instruct and encourage patient and family to use good hand hygiene technique  - Identify and instruct in appropriate isolation precautions for identified infection/condition  Outcome: Progressing  Goal: Absence of fever/infection during neutropenic period  Description  INTERVENTIONS:  - Monitor WBC    Outcome: Progressing     Problem: SAFETY ADULT  Goal: Patient will remain free of falls  Description  INTERVENTIONS:  - Assess patient frequently for physical needs  -  Identify cognitive and physical deficits and behaviors that affect risk of falls    -  [place] fall precautions as indicated by assessment   - Educate patient/family on patient safety including physical limitations  - Instruct patient to call for assistance with activity based on assessment  - Modify environment to reduce risk of injury  - Consider OT/PT consult to assist with strengthening/mobility  Outcome: Progressing  Goal: Maintain or return to baseline ADL function  Description  INTERVENTIONS:  -  Assess patient's ability to carry out ADLs; assess patient's baseline for ADL function and identify physical deficits which impact ability to perform ADLs (bathing, care of mouth/teeth, toileting, grooming, dressing, etc )  - Assess/evaluate cause of self-care deficits   - Assess range of motion  - Assess patient's mobility; develop plan if impaired  - Assess patient's need for assistive devices and provide as appropriate  - Encourage maximum independence but intervene and supervise when necessary  - Involve family in performance of ADLs  - Assess for home care needs following discharge   - Consider OT consult to assist with ADL evaluation and planning for discharge  - Provide patient education as appropriate  Outcome: Progressing  Goal: Maintain or return mobility status to optimal level  Description  INTERVENTIONS:  - Assess patient's baseline mobility status (ambulation, transfers, stairs, etc )    - Identify cognitive and physical deficits and behaviors that affect mobility  - Identify mobility aids required to assist with transfers and/or ambulation (gait belt, sit-to-stand, lift, walker, cane, etc )  - [place] fall precautions as indicated by assessment  - Record patient progress and toleration of activity level on Mobility SBAR; progress patient to next Phase/Stage  - Instruct patient to call for assistance with activity based on assessment  - Consider rehabilitation consult to assist with strengthening/weightbearing, etc   Outcome: Progressing     Problem: DISCHARGE PLANNING  Goal: Discharge to home or other facility with appropriate resources  Description  INTERVENTIONS:  - Identify barriers to discharge w/patient and caregiver  - Arrange for needed discharge resources and transportation as appropriate  - Identify discharge learning needs (meds, wound care, etc )  - Arrange for interpretive services to assist at discharge as needed  - Refer to Case Management Department for coordinating discharge planning if the patient needs post-hospital services based on physician/advanced practitioner order or complex needs related to functional status, cognitive ability, or social support system  Outcome: Progressing     Problem: Knowledge Deficit  Goal: Patient/family/caregiver demonstrates understanding of disease process, treatment plan, medications, and discharge instructions  Description  Complete learning assessment and assess knowledge base    Interventions:  - Provide teaching at level of understanding  - Provide teaching via preferred learning methods  Outcome: Progressing

## 2020-01-22 NOTE — DISCHARGE SUMMARY
Discharge Summary - [facility]    Patient Information: Palmira Montero 46 y o  female MRN: [id number]  Unit/Bed#: -01 Encounter: [id number]    Discharging Physician / Practitioner: Herbie Hanson MD  PCP: No primary care provider on file  Admission Date: 1/20/2020  Discharge Date: 01/22/20    Reason for Admission:  Facial swelling    Discharge Diagnoses:     Principal Problem:    Facial cellulitis with infected teeth-right lower   Active Problems:    Influenza B    Hypokalemia    Anemia, chronic   Resolved Problems:    * No resolved hospital problems  *    Present on Admission:   Facial cellulitis with infected teeth-right lower    Influenza B   Hypokalemia   Anemia, chronic     Consultations During Hospital Stay:  · None    Procedures Performed:     · CT scan    Significant Findings:     No indication of abscess  Right facial cellulitis which appears odontogenic in origin based on location of primary swelling along the lingual surface of the mid body of the mandible  Transspatial cellulitis with reactive adenopathy  Mild secondary   right submandibular sialoadenitis       Incidental Findings:   · As above     Test Results Pending at Discharge (will require follow up): · None     Outpatient Tests Requested:  · None    Complications:  None    Hospital Course:     Palmira Montero is a 46 y o  female patient who originally presented to the hospital on 1/20/2020 due to right facial swelling  She was found have cellulitis of for right side of the face  Recently she also was diagnosed with influenza B  Treated with IV antibiotics  Spoke with oral maxillofacial surgeon yesterday  She has an appointment today at around noon time to intervene on her rotten teeth-possibly the source of her cellulitis/infection  Prescribe some Ultram and also oral antibiotics  As per patient, her  would be here to pick her up to take her to the appointment  Also follow with primary care physician  She is chronically anemic  Advised her to follow with her primary care physician for further workup on her anemia  Her sugar was slightly on the higher side  A1c is pending  Condition at Discharge: fair     Discharge Day Visit / Exam:     Subjective:  Feels better with less pain and less swelling of her right lower part of her face  No chest pain  No nausea or vomiting  No abdominal pain  No fever or chills  Vitals: Blood Pressure: 108/60 (01/22/20 0002)  Pulse: 72 (01/22/20 0002)  Temperature: 98 4 °F (36 9 °C) (01/22/20 0002)  Temp Source: Oral (01/22/20 0002)  Respirations: 18 (01/22/20 0002)  Height: 5' 2" (157 5 cm) (01/20/20 1209)  Weight - Scale: 95 3 kg (210 lb) (01/20/20 1209)  SpO2: 99 % (01/22/20 0002)  Exam:        Vital signs are reviewed as above  Lying in bed  Not in any distress  Has somewhat less swelling and redness of her right lower jaw  Also able to move her job better and open her mouth better  S1 and S2 audible  Chest clear to auscultation  Awake and alert  Oriented x3  Abdomen is soft  Nontender  Bowel sounds are audible  Discharge instructions/Information to patient and family:   See after visit summary for information provided to patient and family  Provisions for Follow-Up Care:  See after visit summary for information related to follow-up care and any pertinent home health orders  Disposition:     Home    For Discharges to [facility] SN[facility]:   · Not Applicable to this Patient - Not Applicable to this Patient    Planned Readmission:  None     Discharge Statement:  I spent more than 30 minutes discharging the patient  This time was spent on the day of discharge  I had direct contact with the patient on the day of discharge  Greater than 50% of the total time was spent examining patient, answering all patient questions, arranging and discussing plan of care with patient as well as directly providing post-discharge instructions    Additional time then spent on discharge activities  Discharge Medications:  See after visit summary for reconciled discharge medications provided to patient and family  ** Please Note: Dragon 360 Dictation voice to text software may have been used in the creation of this document   **

## 2020-01-22 NOTE — DISCHARGE INSTRUCTIONS
Amoxicillin/Clavulanate Potassium (By mouth)   Amoxicillin (e-may-r-ANIBAL-in), Clavulanate Potassium (LLPL-tp-om-kenyatta sqw-CAF-af-um)  Treats infections  This medicine is a penicillin antibiotic  Brand Name(s): Augmentin, Augmentin ES-600, Augmentin XR   There may be other brand names for this medicine  When This Medicine Should Not Be Used: This medicine is not right for everyone  Do not use it if you had an allergic reaction to amoxicillin, clavulanate, or a similar antibiotic (penicillin or cephalosporin), or if you had liver problems caused by Augmentin®  How to Use This Medicine:   Liquid, Tablet, Chewable Tablet, Long Acting Tablet  · Your doctor will tell you how much medicine to use  Do not use more than directed  · Take this medicine with a snack or at the beginning of a meal to help prevent nausea  · Chewable tablets: Chew the tablet completely before you swallow it  · Measure the oral liquid medicine with a marked measuring spoon, oral syringe, or medicine cup  Shake the medicine well just before you measure each dose  Rinse the spoon or dropper after each use  · Swallow the extended-release tablet whole  Do not crush, break, or chew it  · Take all of the medicine in your prescription to clear up your infection, even if you feel better after the first few doses  · Missed dose: Take a dose as soon as you remember  If it is almost time for your next dose, wait until then and take a regular dose  Do not take extra medicine to make up for a missed dose  · Tablet, extended-release tablet, chewable tablet: Store at room temperature, away from heat, moisture, and direct light  · Oral liquid: Store in the refrigerator  Do not freeze  · Throw away any unused oral liquid after 10 days  Drugs and Foods to Avoid:   Ask your doctor or pharmacist before using any other medicine, including over-the-counter medicines, vitamins, and herbal products    · Some medicines can affect how this medicine works  Tell your doctor if you are taking a blood thinner (such as warfarin), allopurinol, or probenecid  Warnings While Using This Medicine:   · Tell your doctor if you are pregnant or breastfeeding, or if you have kidney disease, liver disease, or mononucleosis (mono)  · Birth control pills may not work as well while you are taking this medicine  Use another form of birth control to prevent pregnancy  · This medicine can cause diarrhea  Call your doctor if the diarrhea becomes severe, does not stop, or is bloody  Do not take any medicine to stop diarrhea until you have talked to your doctor  Diarrhea can occur 2 months or more after you stop taking this medicine  · Tell any doctor or dentist who treats you that you are using this medicine  This medicine may affect certain medical test results  · Call your doctor if your symptoms do not improve or if they get worse  · The chewable tablet and oral liquid contain phenylalanine  Talk to your doctor before you use this medicine if you have phenylketonuria (PKU)  · Keep all medicine out of the reach of children  Never share your medicine with anyone  Possible Side Effects While Using This Medicine:   Call your doctor right away if you notice any of these side effects:  · Allergic reaction: Itching or hives, swelling in your face or hands, swelling or tingling in your mouth or throat, chest tightness, trouble breathing  · Blistering, peeling, red skin rash  · Change in how much or how often you urinate  · Dark urine or pale stools, nausea, vomiting, loss of appetite, stomach pain, yellow eyes or skin  · Diarrhea that may contain blood, stomach cramps  If you notice these less serious side effects, talk with your doctor:   · Diaper rash  · Mild diarrhea, nausea, vomiting  · Tooth discoloration (in children)  If you notice other side effects that you think are caused by this medicine, tell your doctor  Call your doctor for medical advice about side effects   You may report side effects to FDA at 7-943-FDA-7949  © 2017 [employer] Information is for End User's use only and may not be sold, redistributed or otherwise used for commercial purposes  The above information is an  only  It is not intended as medical advice for individual conditions or treatments  Talk to your doctor, nurse or pharmacist before following any medical regimen to see if it is safe and effective for you  Tramadol (By mouth)   Tramadol (TRAM-a-dol)  Treats moderate to severe pain  This medicine is a narcotic pain reliever  Brand Name(s): ConZip, FusePaq Synapryn, [place], Ultram, Ultram ER, traMADol HCl   There may be other brand names for this medicine  When This Medicine Should Not Be Used: This medicine is not right for everyone  Do not use if you had an allergic reaction to tramadol or other narcotic medicine, or if you have stomach or bowel blockage (including paralytic ileus)  How to Use This Medicine:   Long Acting Capsule, Liquid, Tablet, Dissolving Tablet, Long Acting Tablet  · Take your medicine as directed  Your dose may need to be changed several times to find what works best for you  · Make sure your hands are dry before you handle the disintegrating tablet  Peel back the foil from the blister pack, then remove the tablet  Do not push the tablet through the foil  Place the tablet in your mouth  After it has melted, swallow or take a drink of water  · Swallow the extended-release tablet whole  Do not crush, break, or chew it  · Drink plenty of liquids to help avoid constipation  · This medicine should come with a Medication Guide  Ask your pharmacist for a copy if you do not have one  · Missed dose: Take a dose as soon as you remember  If it is almost time for your next dose, wait until then and take a regular dose  Do not take extra medicine to make up for a missed dose    · Store the medicine in a closed container at room temperature, away from heat, moisture, and direct light  Drugs and Foods to Avoid:   Ask your doctor or pharmacist before using any other medicine, including over-the-counter medicines, vitamins, and herbal products  · Do not use this medicine if you are using or have used an MAO inhibitor within the past 14 days  · Some medicines can affect how tramadol works  Tell your doctor if you are using any of the following:  ¨ Carbamazepine, cyclobenzaprine, digoxin, erythromycin, ketoconazole, lithium, mirtazapine, phenytoin, promethazine, rifampin, ritonavir, quinidine, or trazodone  ¨ Blood thinner (including warfarin)  ¨ Diuretic (water pill)  ¨ Medicine to treat depression (including bupropion, fluoxetine, paroxetine, quinidine)  ¨ Phenothiazine medicine  ¨ Triptan medicine for migraine headaches  · Tell your doctor if you use anything else that makes you sleepy  Some examples are allergy medicine, narcotic pain medicine, and alcohol  Tell your doctor if you are using a muscle relaxer  · Do not drink alcohol while you are using this medicine  Warnings While Using This Medicine:   · Tell your doctor if you are pregnant or breastfeeding, or if you have kidney disease, liver disease (including cirrhosis), gallstones, lung or breathing problems, pancreas problems, or a history of head injury, seizures, drug addiction, or depression or similar emotional problems  Tell your doctor if you have phenylketonuria  · This medicine may cause the following problems:  ¨ High risk of overdose, which can lead to death  ¨ Respiratory depression (serious breathing problem that can be life-threatening)  ¨ Serotonin syndrome (when used with certain medicines)  ¨ Unusual change in mood or behavior  · This medicine may make you dizzy, drowsy, or lightheaded  Do not drive or doing anything else that could be dangerous until you know how this medicine affects you  · This medicine can be habit-forming  Do not use more than your prescribed dose   Call your doctor if you think your medicine is not working  · Do not stop using this medicine suddenly  Your doctor will need to slowly decrease your dose before you stop it completely  · Tell any doctor or dentist who treats you that you are using this medicine  · This medicine may cause constipation, especially with long-term use  Ask your doctor if you should use a laxative to prevent and treat constipation  · This medicine could cause infertility  Talk with your doctor before using this medicine if you plan to have children  · Keep all medicine out of the reach of children  Never share your medicine with anyone  Possible Side Effects While Using This Medicine:   Call your doctor right away if you notice any of these side effects:  · Allergic reaction: Itching or hives, swelling in your face or hands, swelling or tingling in your mouth or throat, chest tightness, trouble breathing  · Anxiety, restlessness, fast heartbeat, fever, sweating, muscle spasms, nausea, vomiting, diarrhea, seeing or hearing things that are not there  · Blistering, peeling, red skin rash  · Blue lips, fingernails, or skin  · Extreme dizziness, drowsiness, or weakness, shallow breathing, slow heartbeat, seizures, and cold, clammy skin  · Lightheadedness, dizziness, fainting  · Seizures  · Unusual mood or behavior, thoughts of killing yourself or others  · Trouble breathing  If you notice these less serious side effects, talk with your doctor:   · Constipation, loss of appetite, stomach upset  · Dry mouth  · Headache  If you notice other side effects that you think are caused by this medicine, tell your doctor  Call your doctor for medical advice about side effects  You may report side effects to FDA at 2-613-FDA-8993  © 2017 [employer] Information is for End User's use only and may not be sold, redistributed or otherwise used for commercial purposes  The above information is an  only   It is not intended as medical advice for individual conditions or treatments  Talk to your doctor, nurse or pharmacist before following any medical regimen to see if it is safe and effective for you

## 2020-01-23 LAB
FOLATE BLD-MCNC: 360.3 NG/ML
FOLATE RBC-MCNC: 1278 NG/ML
HCT VFR BLD AUTO: 28.2 % (ref 34–46.6)

## 2020-01-24 LAB
METHYLMALONATE SERPL-SCNC: 134 NMOL/L (ref 0–378)
SL AMB DISCLAIMER: NORMAL

## (undated) DEVICE — SPECIMEN CONTAINER STERILE PEEL PACK

## (undated) DEVICE — LASER HOLMIUM FIBER 365 MIC

## (undated) DEVICE — GLOVE INDICATOR PI UNDERGLOVE SZ 8 BLUE

## (undated) DEVICE — PACK TUR

## (undated) DEVICE — GUIDEWIRE STRGHT TIP 0.035 IN  SOLO PLUS

## (undated) DEVICE — 3M™ TEGADERM™ TRANSPARENT FILM DRESSING FRAME STYLE, 1624W, 2-3/8 IN X 2-3/4 IN (6 CM X 7 CM), 100/CT 4CT/CASE: Brand: 3M™ TEGADERM™

## (undated) DEVICE — SCD SEQUENTIAL COMPRESSION COMFORT SLEEVE MEDIUM KNEE LENGTH: Brand: KENDALL SCD

## (undated) DEVICE — LIGHT HANDLE COVER SLEEVE DISP BLUE STELLAR

## (undated) DEVICE — GLOVE SRG BIOGEL ECLIPSE 7.5

## (undated) DEVICE — SHEATH URETERAL ACCESS 12/14FR 35CM PROXIS